# Patient Record
Sex: MALE | Race: WHITE | Employment: FULL TIME | URBAN - METROPOLITAN AREA
[De-identification: names, ages, dates, MRNs, and addresses within clinical notes are randomized per-mention and may not be internally consistent; named-entity substitution may affect disease eponyms.]

---

## 2017-11-24 ENCOUNTER — ALLIED HEALTH/NURSE VISIT (OUTPATIENT)
Dept: NURSING | Facility: CLINIC | Age: 47
End: 2017-11-24
Payer: COMMERCIAL

## 2017-11-24 DIAGNOSIS — Z23 NEED FOR PROPHYLACTIC VACCINATION AND INOCULATION AGAINST INFLUENZA: Primary | ICD-10-CM

## 2017-11-24 PROCEDURE — 90471 IMMUNIZATION ADMIN: CPT

## 2017-11-24 PROCEDURE — 90686 IIV4 VACC NO PRSV 0.5 ML IM: CPT

## 2017-11-24 PROCEDURE — 99207 ZZC NO CHARGE NURSE ONLY: CPT

## 2017-11-24 NOTE — PROGRESS NOTES

## 2017-11-24 NOTE — MR AVS SNAPSHOT
After Visit Summary   11/24/2017    Braden Solomonopp    MRN: [id number]           Patient Information     Date Of Birth          1970        Visit Information        Provider Department      11/24/2017 8:45 AM  FLU CLINIC NURSE [facility]        Today's Diagnoses     Need for prophylactic vaccination and inoculation against influenza    -  1       Follow-ups after your visit        Your next 10 appointments already scheduled     Dec 15, 2017  7:45 AM CST   Return Visit with Clemente Lindsey MD   [facility] ([facility]    [address]  [facility] [address] [phone]              Who to contact     If you have questions or need follow up information about today's clinic visit or your schedule please contact [facility] directly at [phone].  Normal or non-critical lab and imaging results will be communicated to you by [employer], letter or phone within 4 business days after the clinic has received the results. If you do not hear from us within 7 days, please contact the clinic through [employer] or phone. If you have a critical or abnormal lab result, we will notify you by phone as soon as possible.  Submit refill requests through [employer] or call your pharmacy and they will forward the refill request to us. Please allow 3 business days for your refill to be completed.          Additional Information About Your Visit        MyChart Information     [employer] gives you secure access to your electronic health record. If you see a primary care provider, you can also send messages to your care team and make appointments. If you have questions, please call your primary care clinic.  If you do not have a primary care provider, please call [phone] and they will assist you.        Care EveryWhere ID     This is your Care EveryWhere ID. This could be used by other organizations to access your  [place] medical records  [id number]         Blood Pressure from Last 3 Encounters:   12/12/16 148/72   12/07/16 114/81   11/25/16 124/86    Weight from Last 3 Encounters:   12/12/16 254 lb (115.2 kg)   11/25/16 264 lb (119.7 kg)   12/19/13 250 lb (113.4 kg)              We Performed the Following     FLU VAC, SPLIT VIRUS IM > 3 YO (QUADRIVALENT) [42185]     Vaccine Administration, Initial [46158]        Primary Care Provider Office Phone # Fax #    Fanta Malina Yee [phone] [address]  [facility] [address]        Equal Access to Services     REBECCA BOWMAN : Hadii andrae hernandezo Nilsa, waaxda luqadaha, qaybta kaalmada joannayadanielle, mary batista . So [facility] [phone].    ATENCIÓN: Si habla español, tiene a lara disposición servicios gratuitos de asistencia lingüística. [facility] [phone].    We comply with applicable federal civil rights laws and [place] laws. We do not discriminate on the basis of race, color, national origin, age, disability, sex, sexual orientation, or gender identity.            Thank you!     Thank you for choosing [facility]  for your care. Our goal is always to provide you with excellent care. Hearing back from our patients is one way we can continue to improve our services. Please take a few minutes to complete the written survey that you may receive in the mail after your visit with us. Thank you!             Your Updated Medication List - Protect others around you: Learn how to safely use, store and throw away your medicines at www.disposemymeds.org.          This list is accurate as of: 11/24/17  8:53 AM.  Always use your most recent med list.                   Brand Name Dispense Instructions for use Diagnosis    metoprolol 50 MG tablet    LOPRESSOR    30 tablet    Take 0.5 tablets (25 mg) by mouth as needed    Paroxysmal atrial fibrillation (H)       rivaroxaban ANTICOAGULANT 20 MG Tabs tablet    XARELTO     30 tablet    Take 1 tablet (20 mg) by mouth daily (with dinner)

## 2017-12-15 ENCOUNTER — OFFICE VISIT (OUTPATIENT)
Dept: CARDIOLOGY | Facility: CLINIC | Age: 47
End: 2017-12-15
Attending: INTERNAL MEDICINE
Payer: COMMERCIAL

## 2017-12-15 VITALS
WEIGHT: 278.8 LBS | BODY MASS INDEX: 32.26 KG/M2 | SYSTOLIC BLOOD PRESSURE: 128 MMHG | HEIGHT: 78 IN | HEART RATE: 80 BPM | DIASTOLIC BLOOD PRESSURE: 76 MMHG

## 2017-12-15 DIAGNOSIS — I48.0 PAROXYSMAL ATRIAL FIBRILLATION (H): ICD-10-CM

## 2017-12-15 PROCEDURE — 99213 OFFICE O/P EST LOW 20 MIN: CPT | Performed by: INTERNAL MEDICINE

## 2017-12-15 NOTE — PROGRESS NOTES
CARDIOLOGY VISIT    REASON FOR VISIT: f/u afib    SUBJECTIVE:  47-year-old male seen for f/u of paroxysmal atrial fibrillation. He has no significant cardiac risk factors.     Historically patient had episodes of palpitations about twice per year and this was never captured on any monitor for quite some time.    In December 2016 he presented to the ED with palpitations.  He was in rapid afib and was successfully cardioverted.    Echo December 9, 2016: Normal left ventricular size and thickness, ejection fraction 50%, no significant valve disease, ascending aorta 3.9 cm, which is borderline enlarged for patient's size    On visit December 2016 he was instructed to take metoprolol as needed for symptomatic A. fib.     The past one year he has been doing very well.  He denies any episodes of palpitations similar to his A. fib last year.  He has not needed to take any metoprolol.  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  He also does some light to moderate exercise with no exertional symptoms.  Blood pressure tends to run in the 120s.    MEDICATIONS:  Current Outpatient Prescriptions   Medication     metoprolol (LOPRESSOR) 50 MG tablet     rivaroxaban ANTICOAGULANT (XARELTO) 20 MG TABS tablet     No current facility-administered medications for this visit.        ALLERGIES:  No Known Allergies    REVIEW OF SYSTEMS:  Constitutional:  No weight loss, fever, chills, weakness or fatigue.  HEENT:  Eyes:  No visual loss, blurred vision, double vision or yellow sclerae. No hearing loss, sneezing, congestion, runny nose or sore throat.  Skin:  No rash or itching.  Cardiovascular: per HPI  Respiratory: per HPI  GI:  No anorexia, nausea, vomiting or diarrhea. No abdominal pain or blood.  :  No dysurea, hematuria  Neurologic:  No headache, dizziness, syncope, paralysis, ataxia, numbness or tingling in the extremities. No change in bowel or bladder control.  Musculoskeletal:  No  "muscle, back pain, joint pain or stiffness.  Hematologic:  No anemia, bleeding or bruising.  Lymphatics:  No enlarged nodes. No history of splenectomy.  Psychiatric:  No history of depression or anxiety.  Endocrine:  No reports of sweating, cold or heat intolerance. No polyuria or polydipsia.  Allergies:  No history of asthma, hives, eczema or rhinitis.    PHYSICAL EXAM:  /76  Pulse 80  Ht 1.981 m (6' 6\")  Wt 126.5 kg (278 lb 12.8 oz)  BMI 32.22 kg/m2  Constitutional: awake, alert, no distress  Eyes: PERRL, sclera nonicteric  ENT: trachea midline  Respiratory: Lungs clear  Cardiovascular: Regular rate and rhythm, single ectopy and 20 seconds auscultation, murmur  GI: nondistended, nontender, bowel sounds present  Lymph/Hematologic: no lymphadenopathy  Skin: dry, no rash  Musculoskeletal: good muscle tone, strength 5/5 in upper and lower extremities  Neurologic: no focal deficits  Neuropsychiatric: appropriate affact    ASSESSMENT:  47-year-old male seen for follow-up of paroxysmal atrial fibrillation.  He has not had any symptomatically A. fib in the past one year.  He has a supply of metoprolol at home, which he can take as needed if he had symptoms.  If he did develop more frequent A. fib, pill in the pocket strategy with flecainide might be reasonable.  He will take aspirin 81 mg, his CHADS-Vasc score is 0.    RECOMMENDATIONS:  1.  Paroxysmal atrial fibrillation  - Metoprolol as needed for any symptomatically A. fib, he will present to the ER if symptoms do not resolve after about one day  - Aspirin 81 mg daily    Follow up as needed if he has recurrent A. fib.    Clemente Lindsey MD  Cardiology - [facility]  Pager:  [phone]  Text Page  December 15, 2017    "

## 2017-12-15 NOTE — LETTER
12/15/2017    Fanta Yee MD  [address]  [facility] [address]    RE: Braden Lira       Dear Colleague,    I had the pleasure of seeing Braden Lira in the [facility].    CARDIOLOGY VISIT    REASON FOR VISIT: f/u afib    SUBJECTIVE:  47-year-old male seen for f/u of paroxysmal atrial fibrillation. He has no significant cardiac risk factors.     Historically patient had episodes of palpitations about twice per year and this was never captured on any monitor for quite some time.    In December 2016 he presented to the ED with palpitations.  He was in rapid afib and was successfully cardioverted.    Echo December 9, 2016: Normal left ventricular size and thickness, ejection fraction 50%, no significant valve disease, ascending aorta 3.9 cm, which is borderline enlarged for patient's size    On visit December 2016 he was instructed to take metoprolol as needed for symptomatic A. fib.     The past one year he has been doing very well.  He denies any episodes of palpitations similar to his A. fib last year.  He has not needed to take any metoprolol.  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  He also does some light to moderate exercise with no exertional symptoms.  Blood pressure tends to run in the 120s.    MEDICATIONS:  Current Outpatient Prescriptions   Medication     metoprolol (LOPRESSOR) 50 MG tablet     rivaroxaban ANTICOAGULANT (XARELTO) 20 MG TABS tablet     No current facility-administered medications for this visit.        ALLERGIES:  No Known Allergies    REVIEW OF SYSTEMS:  Constitutional:  No weight loss, fever, chills, weakness or fatigue.  HEENT:  Eyes:  No visual loss, blurred vision, double vision or yellow sclerae. No hearing loss, sneezing, congestion, runny nose or sore throat.  Skin:  No rash or itching.  Cardiovascular: per HPI  Respiratory: per HPI  GI:  No anorexia, nausea, vomiting or diarrhea. No  "abdominal pain or blood.  :  No dysurea, hematuria  Neurologic:  No headache, dizziness, syncope, paralysis, ataxia, numbness or tingling in the extremities. No change in bowel or bladder control.  Musculoskeletal:  No muscle, back pain, joint pain or stiffness.  Hematologic:  No anemia, bleeding or bruising.  Lymphatics:  No enlarged nodes. No history of splenectomy.  Psychiatric:  No history of depression or anxiety.  Endocrine:  No reports of sweating, cold or heat intolerance. No polyuria or polydipsia.  Allergies:  No history of asthma, hives, eczema or rhinitis.    PHYSICAL EXAM:  /76  Pulse 80  Ht 1.981 m (6' 6\")  Wt 126.5 kg (278 lb 12.8 oz)  BMI 32.22 kg/m2  Constitutional: awake, alert, no distress  Eyes: PERRL, sclera nonicteric  ENT: trachea midline  Respiratory: Lungs clear  Cardiovascular: Regular rate and rhythm, single ectopy and 20 seconds auscultation, murmur  GI: nondistended, nontender, bowel sounds present  Lymph/Hematologic: no lymphadenopathy  Skin: dry, no rash  Musculoskeletal: good muscle tone, strength 5/5 in upper and lower extremities  Neurologic: no focal deficits  Neuropsychiatric: appropriate affact    ASSESSMENT:  47-year-old male seen for follow-up of paroxysmal atrial fibrillation.  He has not had any symptomatically A. fib in the past one year.  He has a supply of metoprolol at home, which he can take as needed if he had symptoms.  If he did develop more frequent A. fib, pill in the pocket strategy with flecainide might be reasonable.  He will take aspirin 81 mg, his CHADS-Vasc score is 0.    RECOMMENDATIONS:  1.  Paroxysmal atrial fibrillation  - Metoprolol as needed for any symptomatically A. fib, he will present to the ER if symptoms do not resolve after about one day  - Aspirin 81 mg daily    Follow up as needed if he has recurrent A. fib.    Clemente Lindsey MD  Cardiology - [facility]  Pager:  [phone]  Text Page  December 15, 2017    Thank you for allowing " me to participate in the care of your patient.    Sincerely,     Clemente Lindsey MD     [facility]

## 2017-12-15 NOTE — MR AVS SNAPSHOT
"              After Visit Summary   12/15/2017    Braden Lira    MRN: [id number]           Patient Information     Date Of Birth          1970        Visit Information        Provider Department      12/15/2017 7:45 AM Clemente Lindsey MD [facility]        Today's Diagnoses     Paroxysmal atrial fibrillation (H)           Follow-ups after your visit        Who to contact     If you have questions or need follow up information about today's clinic visit or your schedule please contact [facility] directly at [phone].  Normal or non-critical lab and imaging results will be communicated to you by [employer], letter or phone within 4 business days after the clinic has received the results. If you do not hear from us within 7 days, please contact the clinic through [employer] or phone. If you have a critical or abnormal lab result, we will notify you by phone as soon as possible.  Submit refill requests through [employer] or call your pharmacy and they will forward the refill request to us. Please allow 3 business days for your refill to be completed.          Additional Information About Your Visit        MyChart Information     [employer] gives you secure access to your electronic health record. If you see a primary care provider, you can also send messages to your care team and make appointments. If you have questions, please call your primary care clinic.  If you do not have a primary care provider, please call [phone] and they will assist you.        Care EveryWhere ID     This is your Care EveryWhere ID. This could be used by other organizations to access your [place] medical records  [id number]        Your Vitals Were     Pulse Height BMI (Body Mass Index)             80 1.981 m (6' 6\") 32.22 kg/m2          Blood Pressure from Last 3 Encounters:   12/15/17 128/76   12/12/16 148/72   12/07/16 114/81    Weight " from Last 3 Encounters:   12/15/17 126.5 kg (278 lb 12.8 oz)   12/12/16 115.2 kg (254 lb)   11/25/16 119.7 kg (264 lb)              We Performed the Following     Follow-Up with Cardiologist        Primary Care Provider Office Phone # Fax #    Fanta Yee [phone]       [address]  [facility] [address]        Equal Access to Services     REBECCA BOWMAN : Hadii aad ku hadasho Soomaali, waaxda luqadaha, qaybta kaalmada adeegyada, waxay idiin hayaan adeeg kharash la'aan . So [facility] [phone].    ATENCIÓN: Si habla español, tiene a lara disposición servicios gratuitos de asistencia lingüística. [facility] [phone].    We comply with applicable federal civil rights laws and [place] laws. We do not discriminate on the basis of race, color, national origin, age, disability, sex, sexual orientation, or gender identity.            Thank you!     Thank you for choosing [facility]  for your care. Our goal is always to provide you with excellent care. Hearing back from our patients is one way we can continue to improve our services. Please take a few minutes to complete the written survey that you may receive in the mail after your visit with us. Thank you!             Your Updated Medication List - Protect others around you: Learn how to safely use, store and throw away your medicines at www.disposemymeds.org.          This list is accurate as of: 12/15/17  8:09 AM.  Always use your most recent med list.                   Brand Name Dispense Instructions for use Diagnosis    metoprolol 50 MG tablet    LOPRESSOR    30 tablet    Take 0.5 tablets (25 mg) by mouth as needed    Paroxysmal atrial fibrillation (H)

## 2017-12-20 ENCOUNTER — DOCUMENTATION ONLY (OUTPATIENT)
Dept: CARDIOLOGY | Facility: CLINIC | Age: 47
End: 2017-12-20

## 2017-12-20 NOTE — PROGRESS NOTES
"Pt saw Dr. Lindsey 12/15/17 who  Noted:   \"Paroxysmal atrial fibrillation  - Metoprolol as needed for any symptomatically A. fib, he will present to the ER if symptoms do not resolve after about one day  - Aspirin 81 mg daily  Follow up as needed if he has recurrent A. Fib\".     Med list updated to show pt taking ASA 81 mg daily.   Pascual BRIGGS    "

## 2018-07-10 ENCOUNTER — OFFICE VISIT (OUTPATIENT)
Dept: URGENT CARE | Facility: URGENT CARE | Age: 48
End: 2018-07-10
Payer: COMMERCIAL

## 2018-07-10 ENCOUNTER — NURSE TRIAGE (OUTPATIENT)
Dept: NURSING | Facility: CLINIC | Age: 48
End: 2018-07-10

## 2018-07-10 VITALS
OXYGEN SATURATION: 97 % | TEMPERATURE: 98.2 F | SYSTOLIC BLOOD PRESSURE: 132 MMHG | DIASTOLIC BLOOD PRESSURE: 84 MMHG | BODY MASS INDEX: 30.39 KG/M2 | WEIGHT: 263 LBS | HEART RATE: 70 BPM

## 2018-07-10 DIAGNOSIS — L03.221 CELLULITIS OF NECK: Primary | ICD-10-CM

## 2018-07-10 PROCEDURE — 99213 OFFICE O/P EST LOW 20 MIN: CPT | Performed by: PHYSICIAN ASSISTANT

## 2018-07-10 RX ORDER — CEPHALEXIN 500 MG/1
500 CAPSULE ORAL 3 TIMES DAILY
Qty: 30 CAPSULE | Refills: 0 | Status: SHIPPED | OUTPATIENT
Start: 2018-07-10 | End: 2018-07-10

## 2018-07-10 RX ORDER — CEPHALEXIN 500 MG/1
500 CAPSULE ORAL 4 TIMES DAILY
Qty: 28 CAPSULE | Refills: 0 | Status: SHIPPED | OUTPATIENT
Start: 2018-07-10 | End: 2018-07-13

## 2018-07-10 NOTE — MR AVS SNAPSHOT
After Visit Summary   7/10/2018    Braden Masterson    MRN: [id number]           Patient Information     Date Of Birth          1970        Visit Information        Provider Department      7/10/2018 8:45 PM Esdras Melendez PA-C [facility]        Today's Diagnoses     Cellulitis of neck    -  1      Care Instructions    Follow up with primary in two days  Return sooner with worsening of symptoms    Cellulitis  Cellulitis is an infection of the deep layers of skin. A break in the skin, such as a cut or scratch, can let bacteria under the skin. If the bacteria get to deep layers of the skin, it can be serious. If not treated, cellulitis can get into the bloodstream and lymph nodes. The infection can then spread throughout the body. This causes serious illness.  Cellulitis causes the affected skin to become red, swollen, warm, and sore. The reddened areas have a visible border. An open sore may leak fluid (pus). You may have a fever, chills, and pain.  Cellulitis is treated with antibiotics taken for 7 to 10 days. An open sore may be cleaned and covered with cool wet gauze. Symptoms should get better 1 to 2 days after treatment is started. Make sure to take all the antibiotics for the full number of days until they are gone. Keep taking the medicine even if your symptoms go away.  Home care  Follow these tips:    Limit the use of the part of your body with cellulitis.     If the infection is on your leg, keep your leg raised while sitting. This will help to reduce swelling.    Take all of the antibiotic medicine exactly as directed until it is gone. Do not miss any doses, especially during the first 7 days. Don t stop taking the medicine when your symptoms get better.    Keep the affected area clean and dry.    Wash your hands with soap and warm water before and after touching your skin. Anyone else who touches your skin should also wash his or her hands. Don't share  towels.  Follow-up care  Follow up with your healthcare provider, or as advised. If your infection does not go away on the first antibiotic, your healthcare provider will prescribe a different one.  When to seek medical advice  Call your healthcare provider right away if any of these occur:    Red areas that spread    Swelling or pain that gets worse    Fluid leaking from the skin (pus)    Fever higher of 100.4  F (38.0  C) or higher after 2 days on antibiotics  Date Last Reviewed: 9/1/2016 2000-2017 The "[employer]. [address] [place] [address]. All rights reserved. This information is not intended as a substitute for professional medical care. Always follow your healthcare professional's instructions.                Follow-ups after your visit        Your next 10 appointments already scheduled     Aug 09, 2018  8:00 AM CDT   PHYSICAL with Fanta Yee MD   [facility] ([facility])    [address] [place] [address] [phone]              Who to contact     If you have questions or need follow up information about today's clinic visit or your schedule please contact [facility] directly at [phone].  Normal or non-critical lab and imaging results will be communicated to you by MyChart, letter or phone within 4 business days after the clinic has received the results. If you do not hear from us within 7 days, please contact the clinic through MyChart or phone. If you have a critical or abnormal lab result, we will notify you by phone as soon as possible.  Submit refill requests through [employer] or call your pharmacy and they will forward the refill request to us. Please allow 3 business days for your refill to be completed.          Additional Information About Your Visit        [employer] Information     [employer] gives you secure access to your electronic health record. If you see a primary care provider, you can  also send messages to your care team and make appointments. If you have questions, please call your primary care clinic.  If you do not have a primary care provider, please call [phone] and they will assist you.        Care EveryWhere ID     This is your Care EveryWhere ID. This could be used by other organizations to access your [place] medical records  [id number]        Your Vitals Were     Pulse Temperature Pulse Oximetry BMI (Body Mass Index)          70 98.2  F (36.8  C) 97% 30.39 kg/m2         Blood Pressure from Last 3 Encounters:   07/10/18 132/84   12/15/17 128/76   12/12/16 148/72    Weight from Last 3 Encounters:   07/10/18 263 lb (119.3 kg)   12/15/17 278 lb 12.8 oz (126.5 kg)   12/12/16 254 lb (115.2 kg)              Today, you had the following     No orders found for display         Today's Medication Changes          These changes are accurate as of 7/10/18  9:06 PM.  If you have any questions, ask your nurse or doctor.               Start taking these medicines.        Dose/Directions    cephALEXin 500 MG capsule   Commonly known as:  KEFLEX   Used for:  Cellulitis of neck   Started by:  Esdras Melendez PA-C        Dose:  500 mg   Take 1 capsule (500 mg) by mouth 4 times daily for 7 days   Quantity:  28 capsule   Refills:  0            Where to get your medicines      These medications were sent to [facility] [address] [place] [address] AT [address] [address]    Hours:  24-hours Phone:  [phone]     cephALEXin 500 MG capsule                Primary Care Provider Office Phone # Fax #    Fanta Yee [phone] [address] [facility] [address]        Equal Access to Services     REBECCA BOWMAN AH: Rosalia hernandezo Soharry, waaxda luqadaha, qaybta kaalmada adeanayada, amry patrick. So [facility] [phone].    ATENCIÓN: Si clemente starkey,  tiene a lara disposición servicios gratuitos de asistencia lingüística. Jaden gerber [phone].    We comply with applicable federal civil rights laws and [place] laws. We do not discriminate on the basis of race, color, national origin, age, disability, sex, sexual orientation, or gender identity.            Thank you!     Thank you for choosing [facility]  for your care. Our goal is always to provide you with excellent care. Hearing back from our patients is one way we can continue to improve our services. Please take a few minutes to complete the written survey that you may receive in the mail after your visit with us. Thank you!             Your Updated Medication List - Protect others around you: Learn how to safely use, store and throw away your medicines at www.disposemymeds.org.          This list is accurate as of 7/10/18  9:06 PM.  Always use your most recent med list.                   Brand Name Dispense Instructions for use Diagnosis    ASPIRIN PO      Take 81 mg by mouth daily        cephALEXin 500 MG capsule    KEFLEX    28 capsule    Take 1 capsule (500 mg) by mouth 4 times daily for 7 days    Cellulitis of neck       metoprolol tartrate 50 MG tablet    LOPRESSOR    30 tablet    Take 0.5 tablets (25 mg) by mouth as needed    Paroxysmal atrial fibrillation (H)

## 2018-07-11 NOTE — PATIENT INSTRUCTIONS
Follow up with primary in two days  Return sooner with worsening of symptoms    Cellulitis  Cellulitis is an infection of the deep layers of skin. A break in the skin, such as a cut or scratch, can let bacteria under the skin. If the bacteria get to deep layers of the skin, it can be serious. If not treated, cellulitis can get into the bloodstream and lymph nodes. The infection can then spread throughout the body. This causes serious illness.  Cellulitis causes the affected skin to become red, swollen, warm, and sore. The reddened areas have a visible border. An open sore may leak fluid (pus). You may have a fever, chills, and pain.  Cellulitis is treated with antibiotics taken for 7 to 10 days. An open sore may be cleaned and covered with cool wet gauze. Symptoms should get better 1 to 2 days after treatment is started. Make sure to take all the antibiotics for the full number of days until they are gone. Keep taking the medicine even if your symptoms go away.  Home care  Follow these tips:    Limit the use of the part of your body with cellulitis.     If the infection is on your leg, keep your leg raised while sitting. This will help to reduce swelling.    Take all of the antibiotic medicine exactly as directed until it is gone. Do not miss any doses, especially during the first 7 days. Don t stop taking the medicine when your symptoms get better.    Keep the affected area clean and dry.    Wash your hands with soap and warm water before and after touching your skin. Anyone else who touches your skin should also wash his or her hands. Don't share towels.  Follow-up care  Follow up with your healthcare provider, or as advised. If your infection does not go away on the first antibiotic, your healthcare provider will prescribe a different one.  When to seek medical advice  Call your healthcare provider right away if any of these occur:    Red areas that spread    Swelling or pain that gets worse    Fluid leaking from  the skin (pus)    Fever higher of 100.4  F (38.0  C) or higher after 2 days on antibiotics  Date Last Reviewed: 9/1/2016 2000-2017 [employer]. [address] [place] [address], [place] [address]. All rights reserved. This information is not intended as a substitute for professional medical care. Always follow your healthcare professional's instructions.

## 2018-07-11 NOTE — PROGRESS NOTES
SUBJECTIVE:  Braden Lira is a 48 year old male who presents to the clinic today for an infected hair folicle on neck.  Has been worsening over the last 2 days .  Quality/symptoms of rash: painful, red and swollen   Symptoms are moderate and rash seems to be worsening.  Previous history of a similar rash? No    Associated symptoms include: nothing.    Past Medical History:   Diagnosis Date     Atrial fibrillation (H)     cardioversion 12/7/2016     NO ACTIVE PROBLEMS      Current Outpatient Prescriptions   Medication Sig Dispense Refill     ASPIRIN PO Take 81 mg by mouth daily       cephALEXin (KEFLEX) 500 MG capsule Take 1 capsule (500 mg) by mouth 4 times daily for 7 days 28 capsule 0     metoprolol (LOPRESSOR) 50 MG tablet Take 0.5 tablets (25 mg) by mouth as needed (Patient not taking: Reported on 12/15/2017) 30 tablet 2     Social History   Substance Use Topics     Smoking status: Never Smoker     Smokeless tobacco: Never Used     Alcohol use Yes      Comment: 10 drinks a week       ROS:  Review of systems negative except as stated above.    EXAM:   /84  Pulse 70  Temp 98.2  F (36.8  C)  Wt 263 lb (119.3 kg)  SpO2 97%  BMI 30.39 kg/m2  GENERAL: alert, no acute distress.  SKIN: Rash description:    Distribution: localized  Location: neck    Color: red,  Lesion type: induraton  GENERAL APPEARANCE: healthy, alert and no distress  EYES: EOMI,  PERRL, conjunctiva clear  NECK: supple, non-tender to palpation, no adenopathy noted  RESP: lungs clear to auscultation - no rales, rhonchi or wheezes  CV: regular rates and rhythm, normal S1 S2, no murmur noted    ASSESSMENT:  (L03.221) Cellulitis of neck  (primary encounter diagnosis)  Plan: cephALEXin (KEFLEX) 500 MG capsule,         DISCONTINUED: cephALEXin (KEFLEX) 500 MG         capsule    Patient Instructions   Follow up with primary in two days  Return sooner with worsening of symptoms    Cellulitis  Cellulitis is an infection of the deep layers of skin. A  break in the skin, such as a cut or scratch, can let bacteria under the skin. If the bacteria get to deep layers of the skin, it can be serious. If not treated, cellulitis can get into the bloodstream and lymph nodes. The infection can then spread throughout the body. This causes serious illness.  Cellulitis causes the affected skin to become red, swollen, warm, and sore. The reddened areas have a visible border. An open sore may leak fluid (pus). You may have a fever, chills, and pain.  Cellulitis is treated with antibiotics taken for 7 to 10 days. An open sore may be cleaned and covered with cool wet gauze. Symptoms should get better 1 to 2 days after treatment is started. Make sure to take all the antibiotics for the full number of days until they are gone. Keep taking the medicine even if your symptoms go away.  Home care  Follow these tips:    Limit the use of the part of your body with cellulitis.     If the infection is on your leg, keep your leg raised while sitting. This will help to reduce swelling.    Take all of the antibiotic medicine exactly as directed until it is gone. Do not miss any doses, especially during the first 7 days. Don t stop taking the medicine when your symptoms get better.    Keep the affected area clean and dry.    Wash your hands with soap and warm water before and after touching your skin. Anyone else who touches your skin should also wash his or her hands. Don't share towels.  Follow-up care  Follow up with your healthcare provider, or as advised. If your infection does not go away on the first antibiotic, your healthcare provider will prescribe a different one.  When to seek medical advice  Call your healthcare provider right away if any of these occur:    Red areas that spread    Swelling or pain that gets worse    Fluid leaking from the skin (pus)    Fever higher of 100.4  F (38.0  C) or higher after 2 days on antibiotics  Date Last Reviewed: 9/1/2016 2000-2017 [employer]. [address] [place] [address], [place] [address]. All rights reserved. This information is not intended as a substitute for professional medical care. Always follow your healthcare professional's instructions.

## 2018-07-11 NOTE — TELEPHONE ENCOUNTER
[facility] pharmacist is caller. Said they received 2 prescriptions for cephalexin for the patient. She wanted to verify which prescription should be filled. Was given the following information from the patient's medication tab:     cephALEXin (KEFLEX) 500 MG capsule 28 capsule 0 7/10/2018 7/17/2018 --   Sig - Route: Take 1 capsule (500 mg) by mouth 4 times daily for 7 days - Oral     Mirta Browne RN/FNA

## 2018-07-13 ENCOUNTER — OFFICE VISIT (OUTPATIENT)
Dept: FAMILY MEDICINE | Facility: CLINIC | Age: 48
End: 2018-07-13
Payer: COMMERCIAL

## 2018-07-13 VITALS
TEMPERATURE: 98.6 F | OXYGEN SATURATION: 98 % | SYSTOLIC BLOOD PRESSURE: 126 MMHG | WEIGHT: 262.25 LBS | BODY MASS INDEX: 30.31 KG/M2 | HEART RATE: 68 BPM | RESPIRATION RATE: 16 BRPM | DIASTOLIC BLOOD PRESSURE: 85 MMHG

## 2018-07-13 DIAGNOSIS — I48.0 PAROXYSMAL ATRIAL FIBRILLATION (H): ICD-10-CM

## 2018-07-13 DIAGNOSIS — L03.221 CELLULITIS OF NECK: Primary | ICD-10-CM

## 2018-07-13 PROCEDURE — 99214 OFFICE O/P EST MOD 30 MIN: CPT | Performed by: FAMILY MEDICINE

## 2018-07-13 RX ORDER — CEPHALEXIN 500 MG/1
500 CAPSULE ORAL 4 TIMES DAILY
Qty: 20 CAPSULE | Refills: 0 | Status: SHIPPED | OUTPATIENT
Start: 2018-07-13 | End: 2018-07-18

## 2018-07-13 NOTE — PROGRESS NOTES
SUBJECTIVE:   Braden Lira is a 48 year old male who presents to clinic today for the following health issues:      ED/UC Follow up:    Facility:  [facility]  Date of visit: 7/10/2018  Reason for visit: Cellulitis of neck   Current Status:  Pt state that it have improve, swelling have went down and med worked well.      Started with bump under right chin got bigger, redder, & painful & seen in UC & given keflex on 7/10 for cellulitis. Has had 2.5 days of 7 day course so far. Here for a recheck. Redness and swelling has decreased. Feels it is better. Leaving tomorrow with family to trip to [facility] / [place]. Will be 38 miles form [place] where could see a doctor if needed.     No fever or chills, no headache or dizziness, no double or blurry vision, no facial pain, earache, sore throat, runny nose, post nasal drip, no trouble hearing, smelling, tasting or swallowing, no cough , no chest pain, trouble breathing. Hx of a fib not triggered by current illness. No abdominal pain, heart burn, reflux, nausea or vomiting or diarrhea or constipation, no blood in stools or black stools, no weight loss or night sweats. No dysuria, hematuria, frequency, urgency, hesitancy, incontinence, No pelvic complaints. No leg swelling or joint pain. No rash.     Has an appt with PCP Dr Yee in august.     New to me, patient of dr yee, , hx of episodes of palpitations about twice per year never captured on any monitor for quite some time. In December 2016 he presented to the ED with palpitations.  He was in rapid afib and was successfully cardioverted. Echo December 9, 2016: Normal left ventricular size and thickness, ejection fraction 50%, no significant valve disease, ascending aorta 3.9 cm, which was borderline enlarged for patient's size. He was given xarelto for 30 days then switched to asa as [id number] was =0, On visit December 2016 he was instructed to take metoprolol as needed for symptomatic  AJorge lo. He has not needed to take any metoprolol.  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  He also does some light to moderate exercise with no exertional symptoms.  Blood pressure tends to run in the 120's. FH of breast cancer, prior ganglion cyst removal right wrist.     Problem list and histories reviewed & adjusted, as indicated.  Additional history: as documented    Patient Active Problem List   Diagnosis     Family history of malignant neoplasm of breast     Atrial fibrillation (H)     Past Surgical History:   Procedure Laterality Date     CL [facility]FF SURGICAL PATHOLOGY  9-06    ganglion cyst removed from right wrist       Social History   Substance Use Topics     Smoking status: Never Smoker     Smokeless tobacco: Never Used     Alcohol use Yes      Comment: 10 drinks a week     Family History   Problem Relation Age of Onset     Breast Cancer Mother      re-occuring x 1 BRAC +     Eye Disorder Father      cataracts     C.A.D. Father      MI in 3/09     Diabetes Maternal Grandmother      Eye Disorder Maternal Grandmother      cataracts     C.A.D. Paternal Grandfather      C.A.D. Paternal Uncle      Cerebrovascular Disease Paternal Aunt      Family History Negative Sister          Current Outpatient Prescriptions   Medication Sig Dispense Refill     ASPIRIN PO Take 81 mg by mouth daily       cephALEXin (KEFLEX) 500 MG capsule Take 1 capsule (500 mg) by mouth 4 times daily for 5 days 20 capsule 0     metoprolol (LOPRESSOR) 50 MG tablet Take 0.5 tablets (25 mg) by mouth as needed 30 tablet 2     No Known Allergies  Recent Labs   Lab Test  12/07/16   0825  01/15/13   1645   LDL   --   58   HDL   --   41   TRIG   --   183*   CR  1.25   --    GFRESTIMATED  62   --    GFRESTBLACK  75   --    POTASSIUM  4.0   --    TSH  0.73   --       BP Readings from Last 3 Encounters:   07/13/18 126/85   07/10/18 132/84   12/15/17 128/76    Wt Readings from Last 3 Encounters:   07/13/18  262 lb 4 oz (119 kg)   07/10/18 263 lb (119.3 kg)   12/15/17 278 lb 12.8 oz (126.5 kg)                  Labs reviewed in EPIC    Reviewed and updated as needed this visit by clinical staff  Tobacco  Allergies  Meds  Med Hx  Surg Hx  Fam Hx  Soc Hx      Reviewed and updated as needed this visit by Provider         ROS:  Constitutional, HEENT, cardiovascular, pulmonary, GI, , musculoskeletal, neuro, skin, endocrine and psych systems are negative, except as otherwise noted.    OBJECTIVE:     /85 (BP Location: Right arm, Patient Position: Chair, Cuff Size: Adult Regular)  Pulse 68  Temp 98.6  F (37  C) (Oral)  Resp 16  Wt 262 lb 4 oz (119 kg)  SpO2 98%  BMI 30.31 kg/m2  Body mass index is 30.31 kg/(m^2).  GENERAL: healthy, alert and no distress  EYES: Eyes grossly normal to inspection, PERRL and conjunctivae and sclerae normal  HENT: ear canals and TM's normal, nose and mouth without ulcers or lesions  NECK: no adenopathy, thyroid normal to palpation, trachea midline and normal to palpation, no carotid bruits and below right chin / submandibular area anterior neck is a 2.5 cm lump that is red and indurated consistent with cellulitis no fluctuance felt. No punctum or drainage  RESP: lungs clear to auscultation - no rales, rhonchi or wheezes  CV: regular rate and rhythm, normal S1 S2, no S3 or S4, no murmur, click or rub, no peripheral edema and peripheral pulses strong  ABDOMEN: soft, non tender, no hepatosplenomegaly, no masses and bowel sounds normal  MS: no gross musculoskeletal defects noted, no edema  SKIN: no suspicious lesions or rashes  NEURO: Normal strength and tone, mentation intact and speech normal  PSYCH: mentation appears normal, affect normal/bright    Diagnostic Test Results:  No results found for this or any previous visit (from the past 24 hour(s)).    ASSESSMENT/PLAN:     1. Cellulitis of neck  Cellulitis and early abscess below right chin in upper neck better. Warm pack to help  come to a head. Do not press on it unless spontaneously drains. Given travel to remote area given 5 additional days of keflex so total 12 days  And will See back week of 7/23 for recheck. If has increased fever, redness, pain or gets worse to seek local health care   - cephALEXin (KEFLEX) 500 MG capsule; Take 1 capsule (500 mg) by mouth 4 times daily for 5 days  Dispense: 20 capsule; Refill: 0    2. Paroxysmal atrial fibrillation (H)  Asymptomatic. Not affected by current illness. Has not had to use metoprolol.     See Patient Instructions    Trudy Stearns MD  [facility]

## 2018-07-13 NOTE — MR AVS SNAPSHOT
After Visit Summary   7/13/2018    Braden Solomonopp    MRN: [id number]           Patient Information     Date Of Birth          1970        Visit Information        Provider Department      7/13/2018 11:20 AM Trudy Stearns MD [facility]        Today's Diagnoses     Cellulitis of neck    -  1    Paroxysmal atrial fibrillation (H)          Care Instructions    Cellulitis and early abscess below right chin in upper neck better  Warm pack to help come to a head  Do not press on it unless spontaneously drains  5 additional days of keflex so total 12 days   See you back week of 7/23 for recheck  If has increased fever, redness, pain or gets worse seek local health care           Follow-ups after your visit        Your next 10 appointments already scheduled     Aug 09, 2018  8:00 AM CDT   PHYSICAL with Fanta Yee MD   [facility]    [address] [place] [id number] [phone]              Who to contact     If you have questions or need follow up information about today's clinic visit or your schedule please contact [facility] directly at [phone].  Normal or non-critical lab and imaging results will be communicated to you by MyChart, letter or phone within 4 business days after the clinic has received the results. If you do not hear from us within 7 days, please contact the clinic through MyChart or phone. If you have a critical or abnormal lab result, we will notify you by phone as soon as possible.  Submit refill requests through [facility] or call your pharmacy and they will forward the refill request to us. Please allow 3 business days for your refill to be completed.          Additional Information About Your Visit        MyChart Information     [facility]chy gives you secure access to your electronic health record. If you see a primary care provider, you can also send messages to your care team and make  appointments. If you have questions, please call your primary care clinic.  If you do not have a primary care provider, please call [phone] and they will assist you.        Care EveryWhere ID     This is your Care EveryWhere ID. This could be used by other organizations to access your [place] medical records  [id number]        Your Vitals Were     Pulse Temperature Respirations Pulse Oximetry BMI (Body Mass Index)       68 98.6  F (37  C) (Oral) 16 98% 30.31 kg/m2        Blood Pressure from Last 3 Encounters:   07/13/18 126/85   07/10/18 132/84   12/15/17 128/76    Weight from Last 3 Encounters:   07/13/18 262 lb 4 oz (119 kg)   07/10/18 263 lb (119.3 kg)   12/15/17 278 lb 12.8 oz (126.5 kg)              Today, you had the following     No orders found for display         Where to get your medicines      These medications were sent to [email or web address] [address] [place] [address] [address] AT [facility] [address] [facility] [address]     Phone:  [phone]     cephALEXin 500 MG capsule          Primary Care Provider Office Phone # Fax #    Fanta Yee [phone] [address]  [facility] [address]        Equal Access to Services     REBECCA BOWMAN : Hadii andrae luna hadnelsono Soharry, waaxda luqadaha, qaybta kaalmadanielle hui, mary patrick. So [facility] [phone].    ATENCIÓN: Si habla español, tiene a lara disposición servicios gratuitos de asistencia lingüística. Jaden gerber [phone].    We comply with applicable federal civil rights laws and [place] laws. We do not discriminate on the basis of race, color, national origin, age, disability, sex, sexual orientation, or gender identity.            Thank you!     Thank you for choosing [facility]  for your care. Our goal is always to provide you with excellent care. Hearing back from our patients is one way we can continue to improve our services.  Please take a few minutes to complete the written survey that you may receive in the mail after your visit with us. Thank you!             Your Updated Medication List - Protect others around you: Learn how to safely use, store and throw away your medicines at www.disposemymeds.org.          This list is accurate as of 7/13/18 12:04 PM.  Always use your most recent med list.                   Brand Name Dispense Instructions for use Diagnosis    ASPIRIN PO      Take 81 mg by mouth daily        cephALEXin 500 MG capsule    KEFLEX    20 capsule    Take 1 capsule (500 mg) by mouth 4 times daily for 5 days    Cellulitis of neck       metoprolol tartrate 50 MG tablet    LOPRESSOR    30 tablet    Take 0.5 tablets (25 mg) by mouth as needed    Paroxysmal atrial fibrillation (H)

## 2018-07-13 NOTE — PATIENT INSTRUCTIONS
Cellulitis and early abscess below right chin in upper neck better  Warm pack to help come to a head  Do not press on it unless spontaneously drains  5 additional days of keflex so total 12 days   See you back week of 7/23 for recheck  If has increased fever, redness, pain or gets worse seek local health care

## 2018-07-26 ENCOUNTER — OFFICE VISIT (OUTPATIENT)
Dept: FAMILY MEDICINE | Facility: CLINIC | Age: 48
End: 2018-07-26
Payer: COMMERCIAL

## 2018-07-26 VITALS
BODY MASS INDEX: 30.48 KG/M2 | TEMPERATURE: 98.6 F | HEART RATE: 72 BPM | SYSTOLIC BLOOD PRESSURE: 128 MMHG | WEIGHT: 263.75 LBS | RESPIRATION RATE: 16 BRPM | DIASTOLIC BLOOD PRESSURE: 80 MMHG | OXYGEN SATURATION: 98 %

## 2018-07-26 DIAGNOSIS — L72.3 SEBACEOUS CYST: Primary | ICD-10-CM

## 2018-07-26 DIAGNOSIS — L03.221 CELLULITIS OF NECK: ICD-10-CM

## 2018-07-26 PROCEDURE — 99213 OFFICE O/P EST LOW 20 MIN: CPT | Performed by: FAMILY MEDICINE

## 2018-07-26 NOTE — PROGRESS NOTES
SUBJECTIVE:   Braden Lira is a 48 year old male who presents to clinic today for the following health issues:      Pt is here to follow up with last OV for Cellulitis of neck.     Current status: pt state he is doing fine, spot is harden now and there is no pain, no swelling and no redness.     Seen 7/11 in  for worsening boil under right chin on anterior neck and given Keflex. Then seen 7/13 for follow up of right submental cellulitis and abscess and since going away on vacation given additional Keflex to complete total 12 day of meds last dose 7/23. Here for a recheck.    Had a good vacation. No fever or pain  And redness and swelling resolved. Drained twice a lot of pus came out. Once just in action of brushing teeth. No fever or chills, no headache or dizziness, no double or blurry vision, no facial pain, earache, sore throat, runny nose, post nasal drip, no trouble hearing, smelling, tasting or swallowing, no cough , no chest pain, trouble breathing or palpitations, No abdominal pain, heart burn, reflux, nausea or vomiting or diarrhea or constipation, no blood in stools or black stools, no weight loss or night sweats. No dysuria, hematuria, frequency, urgency, hesitancy, incontinence, No pelvic complaints. No leg swelling or joint pain. No rash.    Hx of episodes of palpitations about twice per year never captured on any monitor for quite some time. In December 2016 he presented to the ED with palpitations.  He was in rapid Afib and was successfully cardioverted. Echo December 9, 2016: Normal left ventricular size and thickness, ejection fraction 50%, no significant valve disease, ascending aorta 3.9 cm, which was borderline enlarged for patient's size. He was given xarelto for 30 days then switched to asa as [id number] was =0, On visit December 2016 he was instructed to take metoprolol as needed for symptomatic A. fib. He has not needed to take any metoprolol.  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  He also does some light to moderate exercise with no exertional symptoms.  Blood pressure tends to run in the 120's. FH of breast cancer, prior ganglion cyst removal right wrist.      Problem list and histories reviewed & adjusted, as indicated.  Additional history: as documented    Patient Active Problem List   Diagnosis     Family history of malignant neoplasm of breast     Atrial fibrillation (H)     Past Surgical History:   Procedure Laterality Date     CL AFF SURGICAL PATHOLOGY  9-06    ganglion cyst removed from right wrist       Social History   Substance Use Topics     Smoking status: Never Smoker     Smokeless tobacco: Never Used     Alcohol use Yes      Comment: 10 drinks a week     Family History   Problem Relation Age of Onset     Breast Cancer Mother      re-occuring x 1 BRAC +     Eye Disorder Father      cataracts     C.A.D. Father      MI in 3/09     Diabetes Maternal Grandmother      Eye Disorder Maternal Grandmother      cataracts     C.A.D. Paternal Grandfather      C.A.D. Paternal Uncle      Cerebrovascular Disease Paternal Aunt      Family History Negative Sister          Current Outpatient Prescriptions   Medication Sig Dispense Refill     ASPIRIN PO Take 81 mg by mouth daily       metoprolol (LOPRESSOR) 50 MG tablet Take 0.5 tablets (25 mg) by mouth as needed 30 tablet 2     No Known Allergies  Recent Labs   Lab Test  12/07/16   0825  01/15/13   1645   LDL   --   58   HDL   --   41   TRIG   --   183*   CR  1.25   --    GFRESTIMATED  62   --    GFRESTBLACK  75   --    POTASSIUM  4.0   --    TSH  0.73   --       BP Readings from Last 3 Encounters:   07/26/18 128/80   07/13/18 126/85   07/10/18 132/84    Wt Readings from Last 3 Encounters:   07/26/18 263 lb 12 oz (119.6 kg)   07/13/18 262 lb 4 oz (119 kg)   07/10/18 263 lb (119.3 kg)                  Labs reviewed in EPIC    Reviewed and updated as needed this visit by clinical staff       Reviewed  and updated as needed this visit by Provider         ROS:  Constitutional, HEENT, cardiovascular, pulmonary, GI, , musculoskeletal, neuro, skin, endocrine and psych systems are negative, except as otherwise noted.    OBJECTIVE:     /80 (BP Location: Left arm, Patient Position: Chair, Cuff Size: Adult Large)  Pulse 72  Temp 98.6  F (37  C) (Oral)  Resp 16  Wt 263 lb 12 oz (119.6 kg)  SpO2 98%  BMI 30.48 kg/m2  Body mass index is 30.48 kg/(m^2).  GENERAL: healthy, alert and no distress  EYES: Eyes grossly normal to inspection, PERRL and conjunctivae and sclerae normal  NECK: right submental area 1 cm lump that is firm and ot fluctuant without any redness, tenderness, erythema or drainage.no adenopathy,  or scars and thyroid normal to palpation  RESP: lungs clear to auscultation - no rales, rhonchi or wheezes  CV: regular rates and rhythm  ABDOMEN: soft, non tender  MS: no gross musculoskeletal defects noted, no edema  SKIN: no suspicious lesions or rashes  NEURO: Normal strength and tone, mentation intact and speech normal  PSYCH: mentation appears normal, affect normal/bright    Diagnostic Test Results:  No results found for this or any previous visit (from the past 24 hour(s)).    ASSESSMENT/PLAN:       ICD-10-CM    1. Sebaceous cyst L72.3 DERMATOLOGY REFERRAL   2. Cellulitis of neck L03.221 DERMATOLOGY REFERRAL     No Incision and drainage needed. cellulitis resolved. No abscess seen currently . Has a cyst / scar tissue left in place.  Lump should resolved on it own. Monitor for infection. Sometimes there is a cyst that wont go away and needs complete removal for that see dermatology. Referral given. See Primary Dr Yee as planned for physical with fasting labs   See Patient Instructions    Trudy Stearns MD  [facility]

## 2018-07-26 NOTE — MR AVS SNAPSHOT
After Visit Summary   7/26/2018    Braden Lira    MRN: [id number]           Patient Information     Date Of Birth          1970        Visit Information        Provider Department      7/26/2018 8:00 AM Trudy Stearns MD [facility] [place]        Today's Diagnoses     Sebaceous cyst    -  1    Cellulitis of neck          Care Instructions    No Insidon and drainage needed  Infection resolved  Lump should resolved on it own  Monitor for infection  Sometimes there is a cyst that wont go away and needs compelte removal for that see dermatology   See Primary Dr Yee as planned for physical with fasting labs 8/23          Follow-ups after your visit        Additional Services     DERMATOLOGY REFERRAL       Your provider has referred you to: [facility]: [facility] Dermatology [facility] [phone]   [email or web address]  [facility]: [facility] [phone]  [facility]: Dermatology [facility] [phone]   [email or web address]  [facility]: [facility] [phone]   [email or web address]    Please be aware that coverage of these services is subject to the terms and limitations of your health insurance plan.  Call member services at your health plan with any benefit or coverage questions.      Please bring the following with you to your appointment:    (1) Any X-Rays, CTs or MRIs which have been performed.  Contact the facility where they were done to arrange for  prior to your scheduled appointment.  Any new CT, MRI or other procedures ordered by your specialist must be performed at a [place] facility or coordinated by your clinic's referral office.  (2) List of current medications  (3) This referral request   (4) Any documents/labs given to you for this referral                  Your next 10 appointments already scheduled     Aug 23, 2018  8:20 AM CDT    PHYSICAL with Fanta Yee MD   [facility] ([facility])    [address] [place] [id number] [phone]              Who to contact     If you have questions or need follow up information about today's clinic visit or your schedule please contact [facility] directly at [phone].  Normal or non-critical lab and imaging results will be communicated to you by MyChart, letter or phone within 4 business days after the clinic has received the results. If you do not hear from us within 7 days, please contact the clinic through [employer] or phone. If you have a critical or abnormal lab result, we will notify you by phone as soon as possible.  Submit refill requests through [employer] or call your pharmacy and they will forward the refill request to us. Please allow 3 business days for your refill to be completed.          Additional Information About Your Visit        [employer] Information     [employer] gives you secure access to your electronic health record. If you see a primary care provider, you can also send messages to your care team and make appointments. If you have questions, please call your primary care clinic.  If you do not have a primary care provider, please call [phone] and they will assist you.        Care EveryWhere ID     This is your Care EveryWhere ID. This could be used by other organizations to access your [place] medical records  [id number]        Your Vitals Were     Pulse Temperature Respirations Pulse Oximetry BMI (Body Mass Index)       72 98.6  F (37  C) (Oral) 16 98% 30.48 kg/m2        Blood Pressure from Last 3 Encounters:   07/26/18 128/80   07/13/18 126/85   07/10/18 132/84    Weight from Last 3 Encounters:   07/26/18 263 lb 12 oz (119.6 kg)   07/13/18 262 lb 4 oz (119 kg)   07/10/18 263 lb (119.3 kg)              We Performed the Following     DERMATOLOGY REFERRAL        Primary Care Provider Office Phone # Fax  #    Fanta Yee [phone]       [address]  [facility] [place] [address]        Equal Access to Services     REBECCA BOWMAN : Hadii aad cheryl jair Ash, wasuhasda luqjesus, qashawnta kamendozada ez, mary ledezma laLandonlurdes darnell. So [facility] [phone].    ATENCIÓN: Si habla español, tiene a lara disposición servicios gratuitos de asistencia lingüística. Llame al [phone].    We comply with applicable federal civil rights laws and [place] laws. We do not discriminate on the basis of race, color, national origin, age, disability, sex, sexual orientation, or gender identity.            Thank you!     Thank you for choosing [facility]  for your care. Our goal is always to provide you with excellent care. Hearing back from our patients is one way we can continue to improve our services. Please take a few minutes to complete the written survey that you may receive in the mail after your visit with us. Thank you!             Your Updated Medication List - Protect others around you: Learn how to safely use, store and throw away your medicines at www.disposemymeds.org.          This list is accurate as of 7/26/18  8:19 AM.  Always use your most recent med list.                   Brand Name Dispense Instructions for use Diagnosis    ASPIRIN PO      Take 81 mg by mouth daily        metoprolol tartrate 50 MG tablet    LOPRESSOR    30 tablet    Take 0.5 tablets (25 mg) by mouth as needed    Paroxysmal atrial fibrillation (H)

## 2018-07-26 NOTE — PATIENT INSTRUCTIONS
No Incision and drainage needed  Infection resolved  Lump should resolved on it own  Monitor for infection  Sometimes there is a cyst that wont go away and needs compelte removal for that see dermatology   See Primary Dr Yee as planned for physical with fasting labs 8/23

## 2018-08-23 ENCOUNTER — TELEPHONE (OUTPATIENT)
Dept: OTHER | Facility: CLINIC | Age: 48
End: 2018-08-23

## 2018-08-23 ENCOUNTER — OFFICE VISIT (OUTPATIENT)
Dept: FAMILY MEDICINE | Facility: CLINIC | Age: 48
End: 2018-08-23
Payer: COMMERCIAL

## 2018-08-23 VITALS
RESPIRATION RATE: 14 BRPM | SYSTOLIC BLOOD PRESSURE: 110 MMHG | WEIGHT: 270 LBS | DIASTOLIC BLOOD PRESSURE: 64 MMHG | TEMPERATURE: 98 F | BODY MASS INDEX: 31.24 KG/M2 | HEIGHT: 78 IN | OXYGEN SATURATION: 98 % | HEART RATE: 70 BPM

## 2018-08-23 DIAGNOSIS — N52.9 ERECTILE DYSFUNCTION, UNSPECIFIED ERECTILE DYSFUNCTION TYPE: ICD-10-CM

## 2018-08-23 DIAGNOSIS — I83.92 VARICOSE VEINS OF LEFT LOWER EXTREMITY: ICD-10-CM

## 2018-08-23 DIAGNOSIS — K40.90 INGUINAL HERNIA, LEFT: ICD-10-CM

## 2018-08-23 DIAGNOSIS — Z00.00 ROUTINE GENERAL MEDICAL EXAMINATION AT A HEALTH CARE FACILITY: Primary | ICD-10-CM

## 2018-08-23 LAB
ALBUMIN SERPL-MCNC: 4 G/DL (ref 3.4–5)
ALP SERPL-CCNC: 64 U/L (ref 40–150)
ALT SERPL W P-5'-P-CCNC: 40 U/L (ref 0–70)
ANION GAP SERPL CALCULATED.3IONS-SCNC: 7 MMOL/L (ref 3–14)
ANION GAP SERPL CALCULATED.3IONS-SCNC: 8 MMOL/L (ref 3–14)
AST SERPL W P-5'-P-CCNC: 19 U/L (ref 0–45)
BILIRUB SERPL-MCNC: 0.9 MG/DL (ref 0.2–1.3)
BUN SERPL-MCNC: 18 MG/DL (ref 7–30)
BUN SERPL-MCNC: 18 MG/DL (ref 7–30)
CALCIUM SERPL-MCNC: 8.6 MG/DL (ref 8.5–10.1)
CALCIUM SERPL-MCNC: 8.7 MG/DL (ref 8.5–10.1)
CHLORIDE SERPL-SCNC: 108 MMOL/L (ref 94–109)
CHLORIDE SERPL-SCNC: 109 MMOL/L (ref 94–109)
CHOLEST SERPL-MCNC: 126 MG/DL
CO2 SERPL-SCNC: 26 MMOL/L (ref 20–32)
CO2 SERPL-SCNC: 26 MMOL/L (ref 20–32)
CREAT SERPL-MCNC: 1.18 MG/DL (ref 0.66–1.25)
CREAT SERPL-MCNC: 1.19 MG/DL (ref 0.66–1.25)
GFR SERPL CREATININE-BSD FRML MDRD: 65 ML/MIN/1.7M2
GFR SERPL CREATININE-BSD FRML MDRD: 66 ML/MIN/1.7M2
GLUCOSE SERPL-MCNC: 87 MG/DL (ref 70–99)
GLUCOSE SERPL-MCNC: 90 MG/DL (ref 70–99)
HDLC SERPL-MCNC: 39 MG/DL
LDLC SERPL CALC-MCNC: 61 MG/DL
NONHDLC SERPL-MCNC: 87 MG/DL
POTASSIUM SERPL-SCNC: 4.1 MMOL/L (ref 3.4–5.3)
POTASSIUM SERPL-SCNC: 4.2 MMOL/L (ref 3.4–5.3)
PROT SERPL-MCNC: 7 G/DL (ref 6.8–8.8)
SODIUM SERPL-SCNC: 141 MMOL/L (ref 133–144)
SODIUM SERPL-SCNC: 143 MMOL/L (ref 133–144)
TRIGL SERPL-MCNC: 129 MG/DL

## 2018-08-23 PROCEDURE — 99213 OFFICE O/P EST LOW 20 MIN: CPT | Mod: 25 | Performed by: FAMILY MEDICINE

## 2018-08-23 PROCEDURE — 36415 COLL VENOUS BLD VENIPUNCTURE: CPT | Performed by: FAMILY MEDICINE

## 2018-08-23 PROCEDURE — 99396 PREV VISIT EST AGE 40-64: CPT | Performed by: FAMILY MEDICINE

## 2018-08-23 PROCEDURE — 80053 COMPREHEN METABOLIC PANEL: CPT | Performed by: FAMILY MEDICINE

## 2018-08-23 PROCEDURE — 80061 LIPID PANEL: CPT | Performed by: FAMILY MEDICINE

## 2018-08-23 RX ORDER — SILDENAFIL 25 MG/1
25 TABLET, FILM COATED ORAL DAILY PRN
Qty: 12 TABLET | Refills: 11 | Status: SHIPPED | OUTPATIENT
Start: 2018-08-23 | End: 2021-11-16

## 2018-08-23 NOTE — PROGRESS NOTES
Hello!  It was a pleasure to see you in clinic!  Thank you for getting labs done. Everything looks normal, which is good news.     The testing of your blood sugar, kidney function, liver function and electrolytes was normal.     Your cholesterol is fantastic!  Your total cholesterol and LDL are low.      Your triglycerides are low.  Triglycerides are increased by eating lots of sugar, including juice, excess fruit, bread, pasta, rice and cereal, so you must not eat a lot of these things (or you have good genes!)      Your HDL is almost at goal; HDL .HDL protects the heart.  You can increase your HDL by increasing your level of activity. The start of the school year may be just the ticket!    Keep up the good work!    If you have any questions, please contact the clinic or schedule an appointment with me, thank you!    Sincerely,  Dr. Fanta Yee MD  8/23/2018

## 2018-08-23 NOTE — PROGRESS NOTES
SUBJECTIVE:   CC: Braden Lira is an 48 year old male who presents for preventative health visit.     Physical   Annual:     Getting at least 3 servings of Calcium per day:  Yes    Bi-annual eye exam:  Yes    Dental care twice a year:  Yes    Sleep apnea or symptoms of sleep apnea:  Daytime drowsiness    Diet:  Regular (no restrictions)    Frequency of exercise:  2-3 days/week    Duration of exercise:  Less than 15 minutes    Taking medications regularly:  Yes    Medication side effects:  None    Additional concerns today:  YES    Abdominal Hernia      Duration: 4 years, pt has put off surgery    Description (location/character/radiation): mass on left lower abdominal quad       Associated flank pain: None    Intensity:  No pain, palpable mass, no pulsation     Accompanying signs and symptoms:        Fever/Chills: no        Gas/Bloating: no        Nausea/vomitting: no        Diarrhea: no        Dysuria or Hematuria: no     History (previous similar pain/trauma/previous testing): none    Varicose veins  He has large, sometimes painful varicose veins of his left lower leg, especially bothersome around his knee, associated with spider veins of both legs. Getting more symptomatic over the past few months, he'd like to treat them.    Erectile dysfunction  He is having trouble maintaning erections, worse over the past few months, with no difficulty with desire, initiating erections or orgasm.  No triggering causes noted. This is causing some difficulty with intercourse and is bothersome to patient. He is otherwise well, denies chest pain, sob, poor exercise tolerance (he gets up to 15,000 steps at work daily). He denies any skin or hair changes. He does not use illicit drugs and has not tried anything for ED in the past.    Today's PHQ-2 Score:   PHQ-2 ( 1999 [employer]) 8/23/2018   Q1: Little interest or pleasure in doing things 0   Q2: Feeling down, depressed or hopeless 0   PHQ-2 Score 0   Q1: Little interest or pleasure  in doing things Not at all   Q2: Feeling down, depressed or hopeless Not at all   PHQ-2 Score 0       Abuse: Current or Past(Physical, Sexual or Emotional)- No  Do you feel safe in your environment - Yes    Social History   Substance Use Topics     Smoking status: Never Smoker     Smokeless tobacco: Never Used     Alcohol use Yes      Comment: 10 drinks a week     Alcohol Use 8/23/2018   If you drink alcohol do you typically have greater than 3 drinks per day OR greater than 7 drinks per week? No     Last PSA: No results found for: PSA    Reviewed orders with patient. Reviewed health maintenance and updated orders accordingly - Yes  BP Readings from Last 3 Encounters:   08/23/18 110/64   07/26/18 128/80   07/13/18 126/85    Wt Readings from Last 3 Encounters:   08/23/18 270 lb (122.5 kg)   07/26/18 263 lb 12 oz (119.6 kg)   07/13/18 262 lb 4 oz (119 kg)                  Patient Active Problem List   Diagnosis     Family history of malignant neoplasm of breast     Atrial fibrillation (H)     Varicose veins of left lower extremity     Inguinal hernia, left     Erectile dysfunction, unspecified erectile dysfunction type     Past Surgical History:   Procedure Laterality Date     CL AFF SURGICAL PATHOLOGY  9-06    ganglion cyst removed from right wrist       Social History   Substance Use Topics     Smoking status: Never Smoker     Smokeless tobacco: Never Used     Alcohol use Yes      Comment: 10 drinks a week     Family History   Problem Relation Age of Onset     Breast Cancer Mother      re-occuring x 1 BRAC +     Eye Disorder Father      cataracts     C.A.D. Father      MI in 3/09     Diabetes Maternal Grandmother      Eye Disorder Maternal Grandmother      cataracts     C.A.D. Paternal Grandfather      C.A.D. Paternal Uncle      Cerebrovascular Disease Paternal Aunt      Family History Negative Sister          Current Outpatient Prescriptions   Medication Sig Dispense Refill     ASPIRIN PO Take 81 mg by mouth daily   "     metoprolol (LOPRESSOR) 50 MG tablet Take 0.5 tablets (25 mg) by mouth as needed 30 tablet 2     sildenafil (VIAGRA) 25 MG tablet Take 1 tablet (25 mg) by mouth daily as needed 30 min to 4 hrs before sex. Do not use with nitroglycerin, terazosin or doxazosin. 12 tablet 11     No Known Allergies  Recent Labs   Lab Test  12/07/16   0825  01/15/13   1645   LDL   --   58   HDL   --   41   TRIG   --   183*   CR  1.25   --    GFRESTIMATED  62   --    GFRESTBLACK  75   --    POTASSIUM  4.0   --    TSH  0.73   --         Reviewed and updated as needed this visit by clinical staff  Tobacco  Allergies  Meds  Med Hx  Surg Hx  Fam Hx  Soc Hx        Reviewed and updated as needed this visit by Provider          Review of Systems  CONSTITUTIONAL: NEGATIVE for fever, chills, change in weight  INTEGUMENTARY/SKIN: NEGATIVE for worrisome rashes, moles or lesions  EYES: NEGATIVE for vision changes or irritation  ENT: NEGATIVE for ear, mouth and throat problems  RESP: NEGATIVE for significant cough or SOB  CV: NEGATIVE for chest pain, palpitations or peripheral edema  GI: NEGATIVE for nausea, abdominal pain, heartburn, or change in bowel habits   male: negative for dysuria, hematuria, decreased urinary stream, erectile dysfunction, urethral discharge  MUSCULOSKELETAL: NEGATIVE for significant arthralgias or myalgia  NEURO: NEGATIVE for weakness, dizziness or paresthesias  ENDOCRINE: NEGATIVE for temperature intolerance, skin/hair changes  PSYCHIATRIC: NEGATIVE for changes in mood or affect    OBJECTIVE:   /64 (BP Location: Left arm, Patient Position: Sitting)  Pulse 70  Temp 98  F (36.7  C) (Oral)  Resp 14  Ht 6' 6\" (1.981 m)  Wt 270 lb (122.5 kg)  SpO2 98%  BMI 31.2 kg/m2  BP Readings from Last 3 Encounters:   08/23/18 110/64   07/26/18 128/80   07/13/18 126/85      Physical Exam  GENERAL: healthy, alert and no distress  EYES: Eyes grossly normal to inspection, PERRL and conjunctivae and sclerae normal  HENT: " "ear canals and TM's normal, nose and mouth without ulcers or lesions  NECK: no adenopathy, no asymmetry, masses, or scars and thyroid normal to palpation  RESP: lungs clear to auscultation - no rales, rhonchi or wheezes  CV: regular rate and rhythm, normal S1 S2, no S3 or S4, no murmur, click or rub, no peripheral edema and peripheral pulses strong  ABDOMEN: soft, nontender, no hepatosplenomegaly, no masses and bowel sounds normal, no hernia noted today  MS: no gross musculoskeletal defects noted, no edema  LOWER EXTREMITY: ropy mildly tender large varicose veins noted of left lower extremity, spider veins noted of both LOWER EXTREMITIES  SKIN: no suspicious lesions or rashes  NEURO: Normal strength and tone, mentation intact and speech normal  PSYCH: mentation appears normal, affect normal/bright    ASSESSMENT/PLAN:   1. Routine general medical examination at a health care facility  routine  - Basic metabolic panel  (Ca, Cl, CO2, Creat, Gluc, K, Na, BUN)  - Lipid panel reflex to direct LDL Fasting    2. Varicose veins of left lower extremity  Symptomatic   - VASCULAR SURGERY REFERRAL    3. Inguinal hernia, left  Would like this assessed, possibly surgically treated, refer to general surgeon  - GENERAL SURG ADULT REFERRAL    4. Erectile dysfunction  Difficulty maintaining erections. Check cbc and cmp today, start viagra, he is aware insurance may not pay for this.    GFR Estimate   Date Value Ref Range Status   12/07/2016 62 >60 mL/min/1.7m2 Final     Comment:     Non  GFR Calc   11/04/2009 86 >60 mL/min/1.7m2 Final   01/06/2006 74 >60 mL/min/1.7m2 Final       COUNSELING:   Reviewed preventive health counseling, as reflected in patient instructions       Regular exercise       Healthy diet/nutrition    BP Readings from Last 1 Encounters:   08/23/18 110/64     Estimated body mass index is 31.2 kg/(m^2) as calculated from the following:    Height as of this encounter: 6' 6\" (1.981 m).    Weight as of " this encounter: 270 lb (122.5 kg).      Weight management plan: Discussed healthy diet and exercise guidelines and patient will follow up in 12 months in clinic to re-evaluate.     reports that he has never smoked. He has never used smokeless tobacco.      Counseling Resources:  ATP IV Guidelines  Pooled Cohorts Equation Calculator  FRAX Risk Assessment  ICSI Preventive Guidelines  Dietary Guidelines for Americans, 2010  USDA's MyPlate  ASA Prophylaxis  Lung CA Screening    Fanta Yee MD  [facility]  Answers for HPI/ROS submitted by the patient on 8/23/2018   PHQ-2 Score: 0

## 2018-08-23 NOTE — MR AVS SNAPSHOT
After Visit Summary   8/23/2018    Braden Masterson    MRN: [id number]           Patient Information     Date Of Birth          1970        Visit Information        Provider Department      8/23/2018 8:20 AM Fanta Yee MD [facility]        Today's Diagnoses     Routine general medical examination at a health care facility    -  1    Varicose veins of left lower extremity        Inguinal hernia, left          Care Instructions      Preventive Health Recommendations  Male Ages 40 to 49    Yearly exam:             See your health care provider every year in order to  o   Review health changes.   o   Discuss preventive care.    o   Review your medicines if your doctor has prescribed any.    You should be tested each year for STDs (sexually transmitted diseases) if you re at risk.     Have a cholesterol test every 5 years.     Have a colonoscopy (test for colon cancer) if someone in your family has had colon cancer or polyps before age 50.     After age 45, have a diabetes test (fasting glucose). If you are at risk for diabetes, you should have this test every 3 years.      Talk with your health care provider about whether or not a prostate cancer screening test (PSA) is right for you.    Shots: Get a flu shot each year. Get a tetanus shot every 10 years.     Nutrition:    Eat at least 5 servings of fruits and vegetables daily.     Eat whole-grain bread, whole-wheat pasta and brown rice instead of white grains and rice.     Get adequate Calcium and Vitamin D.     Lifestyle    Exercise for at least 150 minutes a week (30 minutes a day, 5 days a week). This will help you control your weight and prevent disease.     Limit alcohol to one drink per day.     No smoking.     Wear sunscreen to prevent skin cancer.     See your dentist every six months for an exam and cleaning.      Surgery for Varicose Veins  If you have large varicose veins, surgery may be the best choice. However, it  will not prevent new varicose veins from forming. Surgery is most often performed in a hospital or surgery center on an outpatient basis.  Varicose vein surgery    Your surgery will be tailored to your needs. Varicose veins may be tied off (ligation), destroyed, or removed. Blood will then flow through the healthy veins. One or more of the following techniques may be used:  Vein stripping and ligation  In more severe cases, the surgeon may tie off and remove veins by making smaller cuts in the skin. Smaller branching veins may also be tied off or removed.  Microphlebectomy or ambulatory phlebectomy  A special hook is used to gently take out a varicose vein through tiny incisions. Microphlebectomy may be done in your healthcare provider s office.  Sclerotherapy  Your healthcare provider will inject the varicose vein with a special chemical that will quickly close the vein from the inside. This is particularly useful for smaller veins.  PIN stripping  All or part of the vein may be removed with a stripping instrument.  Ablation (laser or radiofrequency)  A tiny cut in the skin is made near the varicose vein. A small tube called a catheter is inserted into the vein. Energy or heat released from the catheter tip will make the vein walls collapse and stick together, stopping all blood flow through the vein.   Know about the risks  Your healthcare provider will talk with you about the risks of surgery. These include:    Bleeding or swelling    A sense of numbness, burning, or tingling in areas near the procedure    Edema or swelling in the legs    Clots in the deep veins that may travel to the lungs    Infection    Scarring   Date Last Reviewed: 5/1/2016 2000-2017 [employer]. [address] [place] [address], [place] [address]. All rights reserved. This information is not intended as a substitute for professional medical care. Always follow your healthcare professional's instructions.        Radiofrequency  Ablation (RFA) Treatment for Varicose Veins  Radiofrequency ablation (RFA) is a procedure to treat varicose veins. It uses heat created from radiofrequency (RF).  Varicose veins are swollen, enlarged veins. They happen most often in the legs. Varicose veins can develop when valves in your veins become damaged. This causes problems with blood flow. Over time, too much blood collects in your veins. The veins may bulge, twist, and stand out under your skin. They can also cause symptoms such as aching, cramping, or swelling in your legs.  During RFA treatment, RF heat is sent into your vein through a thin, flexible tube (catheter). This closes off blood flow in the main problem vein.     With RFA treatment, a catheter that contains RF heat is used to seal off the main problem vein.   Getting ready for your treatment  Follow any instructions from your healthcare provider.  Tell your provider if you:    Are pregnant or think you may be pregnant    Are breastfeeding    Smoke or use alcohol on a regular basis    Have any allergies or intolerances to certain medicines. Explain what reaction you have had to these medicines in the past.  Tell your provider about any medicines you are taking. You may need to stop taking all or some of these before the test. This includes:    Medicines that can thin your blood or prevent clotting (anticoagulants)    All prescription medicines    Over-the-counter medicines such as aspirin or ibuprofen    Street drugs    Herbs, vitamins, and other supplements  Follow any directions you re given for not eating or drinking before the procedure.  The day of your treatment  The treatment takes 45 to 60 minutes. The entire treatment (including time to prepare and recover) takes about 1 to 3 hours. You can go home the same day. For the treatment:     You ll lie down on a hospital bed.    An imaging method, such as ultrasound, is used to guide the procedure.    The leg to be treated is injected with  numbing medicine.    Once your leg is numb, a needle makes a small hole (puncture) in the vein to be treated.    The catheter with the RF heat source is inserted into your vein.    More numbing medicine may be injected around your vein.    Once the catheter is in the right position, it is then slowly drawn backward. As the catheter sends out heat, the vein is closed off.    In some cases, other side branch varicose veins may be removed or tied off through a few small cuts (incisions).    When the treatment is done, the catheter is removed. Pressure is applied to the insertion site to stop any bleeding. An elastic compression stocking or a bandage may then be put on your leg.  Recovering at home  Once at home, follow all the instructions you ve been given. Be sure to:    Take all medicines as directed    Care for the catheter insertion site as directed    Check for signs of infection at the catheter insertion site (see below)    Wear elastic stockings or bandages as directed    Keep your legs raised (elevated) as directed    Walk a few times a day    Avoid heavy exercise, lifting, and standing for long periods as advised    Avoid air travel, hot baths, saunas, or whirlpools as advised  Call your healthcare provider  Call your healthcare provider if you have any of the following:    Fever of 100.4 F (38 C) or higher, or as directed by your provider    Chest pain or trouble breathing    Signs of infection at the catheter insertion site. These include increased redness or swelling (inflammation), warmth, increasing pain, bleeding, or bad-smelling discharge.    Severe numbness or tingling in the treated leg    Severe pain or swelling in the treated leg    Follow-up  You ll have a follow-up visit with your healthcare provider within a week. An ultrasound will be done to check for problems, such as blood clots. Your provider will discuss further treatments with you, if needed.  Risks and possible complications   These  include the following:    Bleeding    Infection    Blood clots    Damage to the nerves in the treated area    Irritation or burning of the skin over the treated vein    Treatment doesn't improve the look or the symptoms of the problem veins    Risks of any medicines used during the treatment   Date Last Reviewed: 5/1/2016 2000-2017 [employer]. [address] [place], [place] [address]. All rights reserved. This information is not intended as a substitute for professional medical care. Always follow your healthcare professional's instructions.                Follow-ups after your visit        Additional Services     GENERAL SURG ADULT REFERRAL       Your provider has referred you to: [facility]: [facility] [phone]   [email or web address]  [facility]: [facility] [phone]   [email or web address]  [facility] [place] [facility] [phone]   [email or web address]    Please be aware that coverage of these services is subject to the terms and limitations of your health insurance plan.  Call member services at your health plan with any benefit or coverage questions.      Please bring the following with you to your appointment:    (1) Any X-Rays, CTs or MRIs which have been performed.  Contact the facility where they were done to arrange for  prior to your scheduled appointment.   (2) List of current medications   (3) This referral request   (4) Any documents/labs given to you for this referral            VASCULAR SURGERY REFERRAL       Your provider has referred you to: **[facility] [phone] - Varicose Veins    [email or web address]    Please be aware that coverage of these services is subject to the terms and limitations of your health insurance plan.  Call member services  "at your health plan with any benefit or coverage questions.      Please bring the following with you to your appointment:    (1) Any X-Rays, CTs or MRIs which have been performed.  Contact the facility where they were done to arrange for  prior to your scheduled appointment.    (2) List of current medications   (3) This referral request   (4) Any documents/labs given to you for this referral                  Who to contact     If you have questions or need follow up information about today's clinic visit or your schedule please contact [facility] directly at [phone].  Normal or non-critical lab and imaging results will be communicated to you by [employer], letter or phone within 4 business days after the clinic has received the results. If you do not hear from us within 7 days, please contact the clinic through [employer] or phone. If you have a critical or abnormal lab result, we will notify you by phone as soon as possible.  Submit refill requests through [facility] or call your pharmacy and they will forward the refill request to us. Please allow 3 business days for your refill to be completed.          Additional Information About Your Visit        MyChart Information     [facility] gives you secure access to your electronic health record. If you see a primary care provider, you can also send messages to your care team and make appointments. If you have questions, please call your primary care clinic.  If you do not have a primary care provider, please call [phone] and they will assist you.        Care EveryWhere ID     This is your Care EveryWhere ID. This could be used by other organizations to access your [facility] medical records  [id number]        Your Vitals Were     Pulse Temperature Respirations Height Pulse Oximetry BMI (Body Mass Index)    70 98  F (36.7  C) (Oral) 14 6' 6\" (1.981 m) 98% 31.2 kg/m2       Blood Pressure from Last 3 Encounters:   08/23/18 110/64   07/26/18 128/80 "   07/13/18 126/85    Weight from Last 3 Encounters:   08/23/18 270 lb (122.5 kg)   07/26/18 263 lb 12 oz (119.6 kg)   07/13/18 262 lb 4 oz (119 kg)              We Performed the Following     Basic metabolic panel  (Ca, Cl, CO2, Creat, Gluc, K, Na, BUN)     GENERAL SURG ADULT REFERRAL     Lipid panel reflex to direct LDL Fasting     VASCULAR SURGERY REFERRAL        Primary Care Provider Office Phone # Fax #    Fanta Yee [phone] [address] [facility] [address]        Equal Access to Services     [facility] : Hadii aad ku hadasho Soharry, waaxda luqadaha, qaybta kaalmada adeanayadanielle, mary batista . [facility] [phone].    ATENCIÓN: Si habla español, tiene a lara disposición servicios gratuitos de asistencia lingüística. [facility] [phone].    We comply with applicable federal civil rights laws and [place] laws. We do not discriminate on the basis of race, color, national origin, age, disability, sex, sexual orientation, or gender identity.            Thank you!     Thank you for choosing [facility]  for your care. Our goal is always to provide you with excellent care. Hearing back from our patients is one way we can continue to improve our services. Please take a few minutes to complete the written survey that you may receive in the mail after your visit with us. Thank you!             Your Updated Medication List - Protect others around you: Learn how to safely use, store and throw away your medicines at www.disposemymeds.org.          This list is accurate as of 8/23/18  9:09 AM.  Always use your most recent med list.                   Brand Name Dispense Instructions for use Diagnosis    ASPIRIN PO      Take 81 mg by mouth daily        metoprolol tartrate 50 MG tablet    LOPRESSOR    30 tablet    Take 0.5 tablets (25 mg) by mouth as needed    Paroxysmal atrial fibrillation (H)

## 2018-08-23 NOTE — PATIENT INSTRUCTIONS
Preventive Health Recommendations  Male Ages 40 to 49    Yearly exam:             See your health care provider every year in order to  o   Review health changes.   o   Discuss preventive care.    o   Review your medicines if your doctor has prescribed any.    You should be tested each year for STDs (sexually transmitted diseases) if you re at risk.     Have a cholesterol test every 5 years.     Have a colonoscopy (test for colon cancer) if someone in your family has had colon cancer or polyps before age 50.     After age 45, have a diabetes test (fasting glucose). If you are at risk for diabetes, you should have this test every 3 years.      Talk with your health care provider about whether or not a prostate cancer screening test (PSA) is right for you.    Shots: Get a flu shot each year. Get a tetanus shot every 10 years.     Nutrition:    Eat at least 5 servings of fruits and vegetables daily.     Eat whole-grain bread, whole-wheat pasta and brown rice instead of white grains and rice.     Get adequate Calcium and Vitamin D.     Lifestyle    Exercise for at least 150 minutes a week (30 minutes a day, 5 days a week). This will help you control your weight and prevent disease.     Limit alcohol to one drink per day.     No smoking.     Wear sunscreen to prevent skin cancer.     See your dentist every six months for an exam and cleaning.      Surgery for Varicose Veins  If you have large varicose veins, surgery may be the best choice. However, it will not prevent new varicose veins from forming. Surgery is most often performed in a hospital or surgery center on an outpatient basis.  Varicose vein surgery    Your surgery will be tailored to your needs. Varicose veins may be tied off (ligation), destroyed, or removed. Blood will then flow through the healthy veins. One or more of the following techniques may be used:  Vein stripping and ligation  In more severe cases, the surgeon may tie off and remove veins by  making smaller cuts in the skin. Smaller branching veins may also be tied off or removed.  Microphlebectomy or ambulatory phlebectomy  A special hook is used to gently take out a varicose vein through tiny incisions. Microphlebectomy may be done in your healthcare provider s office.  Sclerotherapy  Your healthcare provider will inject the varicose vein with a special chemical that will quickly close the vein from the inside. This is particularly useful for smaller veins.  PIN stripping  All or part of the vein may be removed with a stripping instrument.  Ablation (laser or radiofrequency)  A tiny cut in the skin is made near the varicose vein. A small tube called a catheter is inserted into the vein. Energy or heat released from the catheter tip will make the vein walls collapse and stick together, stopping all blood flow through the vein.   Know about the risks  Your healthcare provider will talk with you about the risks of surgery. These include:    Bleeding or swelling    A sense of numbness, burning, or tingling in areas near the procedure    Edema or swelling in the legs    Clots in the deep veins that may travel to the lungs    Infection    Scarring   Date Last Reviewed: 5/1/2016 2000-2017 [employer]. [address] [place] [address]. All rights reserved. This information is not intended as a substitute for professional medical care. Always follow your healthcare professional's instructions.        Radiofrequency Ablation (RFA) Treatment for Varicose Veins  Radiofrequency ablation (RFA) is a procedure to treat varicose veins. It uses heat created from radiofrequency (RF).  Varicose veins are swollen, enlarged veins. They happen most often in the legs. Varicose veins can develop when valves in your veins become damaged. This causes problems with blood flow. Over time, too much blood collects in your veins. The veins may bulge, twist, and stand out under your skin. They can also cause  symptoms such as aching, cramping, or swelling in your legs.  During RFA treatment, RF heat is sent into your vein through a thin, flexible tube (catheter). This closes off blood flow in the main problem vein.     With RFA treatment, a catheter that contains RF heat is used to seal off the main problem vein.   Getting ready for your treatment  Follow any instructions from your healthcare provider.  Tell your provider if you:    Are pregnant or think you may be pregnant    Are breastfeeding    Smoke or use alcohol on a regular basis    Have any allergies or intolerances to certain medicines. Explain what reaction you have had to these medicines in the past.  Tell your provider about any medicines you are taking. You may need to stop taking all or some of these before the test. This includes:    Medicines that can thin your blood or prevent clotting (anticoagulants)    All prescription medicines    Over-the-counter medicines such as aspirin or ibuprofen    Street drugs    Herbs, vitamins, and other supplements  Follow any directions you re given for not eating or drinking before the procedure.  The day of your treatment  The treatment takes 45 to 60 minutes. The entire treatment (including time to prepare and recover) takes about 1 to 3 hours. You can go home the same day. For the treatment:     You ll lie down on a hospital bed.    An imaging method, such as ultrasound, is used to guide the procedure.    The leg to be treated is injected with numbing medicine.    Once your leg is numb, a needle makes a small hole (puncture) in the vein to be treated.    The catheter with the RF heat source is inserted into your vein.    More numbing medicine may be injected around your vein.    Once the catheter is in the right position, it is then slowly drawn backward. As the catheter sends out heat, the vein is closed off.    In some cases, other side branch varicose veins may be removed or tied off through a few small cuts  (incisions).    When the treatment is done, the catheter is removed. Pressure is applied to the insertion site to stop any bleeding. An elastic compression stocking or a bandage may then be put on your leg.  Recovering at home  Once at home, follow all the instructions you ve been given. Be sure to:    Take all medicines as directed    Care for the catheter insertion site as directed    Check for signs of infection at the catheter insertion site (see below)    Wear elastic stockings or bandages as directed    Keep your legs raised (elevated) as directed    Walk a few times a day    Avoid heavy exercise, lifting, and standing for long periods as advised    Avoid air travel, hot baths, saunas, or whirlpools as advised  Call your healthcare provider  Call your healthcare provider if you have any of the following:    Fever of 100.4 F (38 C) or higher, or as directed by your provider    Chest pain or trouble breathing    Signs of infection at the catheter insertion site. These include increased redness or swelling (inflammation), warmth, increasing pain, bleeding, or bad-smelling discharge.    Severe numbness or tingling in the treated leg    Severe pain or swelling in the treated leg    Follow-up  You ll have a follow-up visit with your healthcare provider within a week. An ultrasound will be done to check for problems, such as blood clots. Your provider will discuss further treatments with you, if needed.  Risks and possible complications   These include the following:    Bleeding    Infection    Blood clots    Damage to the nerves in the treated area    Irritation or burning of the skin over the treated vein    Treatment doesn't improve the look or the symptoms of the problem veins    Risks of any medicines used during the treatment   Date Last Reviewed: 5/1/2016 2000-2017 [employer]. [address] [place] [address], [place] [address]. All rights reserved. This information is not intended as a substitute for  professional medical care. Always follow your healthcare professional's instructions.

## 2018-08-23 NOTE — TELEPHONE ENCOUNTER
"Referral received via EPIC \"in box\", per  guidelines referral forwarded to [facility].     Yarelis Mendez, IVANN, RN    "

## 2018-10-15 ENCOUNTER — ALLIED HEALTH/NURSE VISIT (OUTPATIENT)
Dept: NURSING | Facility: CLINIC | Age: 48
End: 2018-10-15
Payer: COMMERCIAL

## 2018-10-15 DIAGNOSIS — Z23 NEED FOR PROPHYLACTIC VACCINATION AND INOCULATION AGAINST INFLUENZA: Primary | ICD-10-CM

## 2018-10-15 PROCEDURE — 99207 ZZC NO CHARGE NURSE ONLY: CPT

## 2018-10-15 PROCEDURE — 90686 IIV4 VACC NO PRSV 0.5 ML IM: CPT

## 2018-10-15 PROCEDURE — 90471 IMMUNIZATION ADMIN: CPT

## 2018-10-15 NOTE — MR AVS SNAPSHOT
After Visit Summary   10/15/2018    Braden Solomonopp    MRN: [id number]           Patient Information     Date Of Birth          1970        Visit Information        Provider Department      10/15/2018 3:00 PM  FLU CLINIC NURSE [facility]        Today's Diagnoses     Need for prophylactic vaccination and inoculation against influenza    -  1       Follow-ups after your visit        Who to contact     If you have questions or need follow up information about today's clinic visit or your schedule please contact [facility] directly at [phone].  Normal or non-critical lab and imaging results will be communicated to you by [employer], letter or phone within 4 business days after the clinic has received the results. If you do not hear from us within 7 days, please contact the clinic through [employer] or phone. If you have a critical or abnormal lab result, we will notify you by phone as soon as possible.  Submit refill requests through [employer] or call your pharmacy and they will forward the refill request to us. Please allow 3 business days for your refill to be completed.          Additional Information About Your Visit        MyChart Information     Wishpot gives you secure access to your electronic health record. If you see a primary care provider, you can also send messages to your care team and make appointments. If you have questions, please call your primary care clinic.  If you do not have a primary care provider, please call [phone] and they will assist you.        Care EveryWhere ID     This is your Care EveryWhere ID. This could be used by other organizations to access your [place] medical records  [id number]         Blood Pressure from Last 3 Encounters:   08/23/18 110/64   07/26/18 128/80   07/13/18 126/85    Weight from Last 3 Encounters:   08/23/18 270 lb (122.5 kg)   07/26/18 263 lb 12 oz (119.6 kg)   07/13/18 262 lb 4 oz (119 kg)              We  Performed the Following     FLU VACCINE, SPLIT VIRUS, IM (QUADRIVALENT) [93382]- >3 YRS     Vaccine Administration, Initial [92364]        Primary Care Provider Office Phone # Fax #    Fanta Yee [phone] [address]  [facility] [address]        Equal Access to Services     DION BOWMAN : Hadii aad ku hadasho Soomaali, waaxda luqadaha, qaybta kaalmada adeegyada, waxay idiin hayaan adeeg kharash lamariselan . So [facility] [phone].    ATENCIÓN: Si habla español, tiene a lara disposición servicios gratuitos de asistencia lingüística. Llame al [phone].    We comply with applicable federal civil rights laws and [place] laws. We do not discriminate on the basis of race, color, national origin, age, disability, sex, sexual orientation, or gender identity.            Thank you!     Thank you for choosing [facility]  for your care. Our goal is always to provide you with excellent care. Hearing back from our patients is one way we can continue to improve our services. Please take a few minutes to complete the written survey that you may receive in the mail after your visit with us. Thank you!             Your Updated Medication List - Protect others around you: Learn how to safely use, store and throw away your medicines at www.disposemymeds.org.          This list is accurate as of 10/15/18  3:12 PM.  Always use your most recent med list.                   Brand Name Dispense Instructions for use Diagnosis    ASPIRIN PO      Take 81 mg by mouth daily        metoprolol tartrate 50 MG tablet    LOPRESSOR    30 tablet    Take 0.5 tablets (25 mg) by mouth as needed    Paroxysmal atrial fibrillation (H)       sildenafil 25 MG tablet    VIAGRA    12 tablet    Take 1 tablet (25 mg) by mouth daily as needed 30 min to 4 hrs before sex. Do not use with nitroglycerin, terazosin or doxazosin.    Erectile dysfunction, unspecified erectile dysfunction type, Routine general medical  examination at a health care facility, Varicose veins of left lower extremity, Inguinal hernia, left

## 2018-10-15 NOTE — PROGRESS NOTES

## 2019-11-05 ENCOUNTER — HEALTH MAINTENANCE LETTER (OUTPATIENT)
Age: 49
End: 2019-11-05

## 2020-11-09 ENCOUNTER — ALLIED HEALTH/NURSE VISIT (OUTPATIENT)
Dept: NURSING | Facility: CLINIC | Age: 50
End: 2020-11-09
Payer: COMMERCIAL

## 2020-11-09 DIAGNOSIS — Z23 NEED FOR PROPHYLACTIC VACCINATION AND INOCULATION AGAINST INFLUENZA: Primary | ICD-10-CM

## 2020-11-09 PROCEDURE — 90471 IMMUNIZATION ADMIN: CPT

## 2020-11-09 PROCEDURE — 90686 IIV4 VACC NO PRSV 0.5 ML IM: CPT

## 2020-11-22 ENCOUNTER — HEALTH MAINTENANCE LETTER (OUTPATIENT)
Age: 50
End: 2020-11-22

## 2021-06-04 ENCOUNTER — OFFICE VISIT (OUTPATIENT)
Dept: FAMILY MEDICINE | Facility: CLINIC | Age: 51
End: 2021-06-04
Payer: COMMERCIAL

## 2021-06-04 VITALS
RESPIRATION RATE: 16 BRPM | BODY MASS INDEX: 29.97 KG/M2 | SYSTOLIC BLOOD PRESSURE: 110 MMHG | TEMPERATURE: 98.1 F | DIASTOLIC BLOOD PRESSURE: 84 MMHG | WEIGHT: 259 LBS | HEIGHT: 78 IN

## 2021-06-04 DIAGNOSIS — K40.90 INGUINAL HERNIA, LEFT: ICD-10-CM

## 2021-06-04 DIAGNOSIS — Z12.11 SCREEN FOR COLON CANCER: ICD-10-CM

## 2021-06-04 DIAGNOSIS — I48.0 PAROXYSMAL ATRIAL FIBRILLATION (H): ICD-10-CM

## 2021-06-04 DIAGNOSIS — Z13.6 CARDIOVASCULAR SCREENING; LDL GOAL LESS THAN 160: ICD-10-CM

## 2021-06-04 DIAGNOSIS — Z11.59 NEED FOR HEPATITIS C SCREENING TEST: ICD-10-CM

## 2021-06-04 DIAGNOSIS — Z00.00 ROUTINE GENERAL MEDICAL EXAMINATION AT A HEALTH CARE FACILITY: Primary | ICD-10-CM

## 2021-06-04 DIAGNOSIS — I83.813 VARICOSE VEINS OF BOTH LOWER EXTREMITIES WITH PAIN: ICD-10-CM

## 2021-06-04 LAB
ALBUMIN SERPL-MCNC: 4.2 G/DL (ref 3.4–5)
ALP SERPL-CCNC: 66 U/L (ref 40–150)
ALT SERPL W P-5'-P-CCNC: 36 U/L (ref 0–70)
ANION GAP SERPL CALCULATED.3IONS-SCNC: 6 MMOL/L (ref 3–14)
AST SERPL W P-5'-P-CCNC: 16 U/L (ref 0–45)
BILIRUB SERPL-MCNC: 1.3 MG/DL (ref 0.2–1.3)
BUN SERPL-MCNC: 12 MG/DL (ref 7–30)
CALCIUM SERPL-MCNC: 8.9 MG/DL (ref 8.5–10.1)
CHLORIDE SERPL-SCNC: 108 MMOL/L (ref 94–109)
CHOLEST SERPL-MCNC: 138 MG/DL
CO2 SERPL-SCNC: 26 MMOL/L (ref 20–32)
CREAT SERPL-MCNC: 1.15 MG/DL (ref 0.66–1.25)
GFR SERPL CREATININE-BSD FRML MDRD: 74 ML/MIN/{1.73_M2}
GLUCOSE SERPL-MCNC: 89 MG/DL (ref 70–99)
HCV AB SERPL QL IA: NONREACTIVE
HDLC SERPL-MCNC: 43 MG/DL
LDLC SERPL CALC-MCNC: 68 MG/DL
NONHDLC SERPL-MCNC: 95 MG/DL
POTASSIUM SERPL-SCNC: 4.5 MMOL/L (ref 3.4–5.3)
PROT SERPL-MCNC: 7.1 G/DL (ref 6.8–8.8)
SODIUM SERPL-SCNC: 140 MMOL/L (ref 133–144)
TRIGL SERPL-MCNC: 134 MG/DL

## 2021-06-04 PROCEDURE — 86803 HEPATITIS C AB TEST: CPT | Performed by: FAMILY MEDICINE

## 2021-06-04 PROCEDURE — 36415 COLL VENOUS BLD VENIPUNCTURE: CPT | Performed by: FAMILY MEDICINE

## 2021-06-04 PROCEDURE — 99396 PREV VISIT EST AGE 40-64: CPT | Mod: 25 | Performed by: FAMILY MEDICINE

## 2021-06-04 PROCEDURE — 90471 IMMUNIZATION ADMIN: CPT | Performed by: FAMILY MEDICINE

## 2021-06-04 PROCEDURE — 90750 HZV VACC RECOMBINANT IM: CPT | Performed by: FAMILY MEDICINE

## 2021-06-04 PROCEDURE — 80053 COMPREHEN METABOLIC PANEL: CPT | Performed by: FAMILY MEDICINE

## 2021-06-04 PROCEDURE — 80061 LIPID PANEL: CPT | Performed by: FAMILY MEDICINE

## 2021-06-04 RX ORDER — METOPROLOL TARTRATE 50 MG
25 TABLET ORAL PRN
Qty: 15 TABLET | Refills: 1 | Status: SHIPPED | OUTPATIENT
Start: 2021-06-04 | End: 2021-08-10

## 2021-06-04 ASSESSMENT — ENCOUNTER SYMPTOMS
FREQUENCY: 0
HEMATOCHEZIA: 0
COUGH: 0
SHORTNESS OF BREATH: 0
MYALGIAS: 0
NAUSEA: 0
PALPITATIONS: 0
CONSTIPATION: 0
FEVER: 0
DYSURIA: 0
DIZZINESS: 0
WEAKNESS: 0
EYE PAIN: 0
JOINT SWELLING: 0
NERVOUS/ANXIOUS: 0
HEARTBURN: 0
ARTHRALGIAS: 0
HEADACHES: 0
SORE THROAT: 0
ABDOMINAL PAIN: 0
DIARRHEA: 0
HEMATURIA: 0
PARESTHESIAS: 0
CHILLS: 0

## 2021-06-04 ASSESSMENT — MIFFLIN-ST. JEOR: SCORE: 2168.07

## 2021-06-04 NOTE — PROGRESS NOTES
SUBJECTIVE:   CC: Braden Lira is an 51 year old male who presents for preventative health visit.     Patient has been advised of split billing requirements and indicates understanding: Yes  Healthy Habits:     Getting at least 3 servings of Calcium per day:  Yes    Bi-annual eye exam:  Yes    Dental care twice a year:  Yes    Sleep apnea or symptoms of sleep apnea:  None    Diet:  Regular (no restrictions)    Frequency of exercise:  1 day/week    Duration of exercise:  15-30 minutes    Taking medications regularly:  Yes    Medication side effects:  None    PHQ-2 Total Score: 0    Additional concerns today:  No      Medication Followup of metoprolol 50mg    Taking Medication as prescribed: yes    Side Effects:  None    Medication Helping Symptoms:  yes       Today's PHQ-2 Score:   PHQ-2 ( 1999 Pfizer) 6/4/2021   Q1: Little interest or pleasure in doing things 0   Q2: Feeling down, depressed or hopeless 0   PHQ-2 Score 0   Q1: Little interest or pleasure in doing things Not at all   Q2: Feeling down, depressed or hopeless Not at all   PHQ-2 Score 0       Abuse: Current or Past(Physical, Sexual or Emotional)- No  Do you feel safe in your environment? Yes    Have you ever done Advance Care Planning? (For example, a Health Directive, POLST, or a discussion with a medical provider or your loved ones about your wishes): No, advance care planning information given to patient to review.  Patient plans to discuss their wishes with loved ones or provider.      Social History     Tobacco Use     Smoking status: Never Smoker     Smokeless tobacco: Never Used   Substance Use Topics     Alcohol use: Yes     Comment: 6 drinks a week     If you drink alcohol do you typically have >3 drinks per day or >7 drinks per week? No    Alcohol Use 6/4/2021   Prescreen: >3 drinks/day or >7 drinks/week? No   Prescreen: >3 drinks/day or >7 drinks/week? -   No flowsheet data found.    Last PSA: No results found for: PSA    Reviewed orders with  patient. Reviewed health maintenance and updated orders accordingly - Yes  BP Readings from Last 3 Encounters:   06/04/21 110/84   08/23/18 110/64   07/26/18 128/80    Wt Readings from Last 3 Encounters:   06/04/21 117.5 kg (259 lb)   08/23/18 122.5 kg (270 lb)   07/26/18 119.6 kg (263 lb 12 oz)                  Patient Active Problem List   Diagnosis     CARDIOVASCULAR SCREENING; LDL GOAL LESS THAN 160     Family history of malignant neoplasm of breast     Atrial fibrillation (H)     Varicose veins of left lower extremity     Inguinal hernia, left     Erectile dysfunction, unspecified erectile dysfunction type     Past Surgical History:   Procedure Laterality Date     CL AFF SURGICAL PATHOLOGY  9-06    ganglion cyst removed from right wrist       Social History     Tobacco Use     Smoking status: Never Smoker     Smokeless tobacco: Never Used   Substance Use Topics     Alcohol use: Yes     Comment: 6 drinks a week     Family History   Problem Relation Age of Onset     Breast Cancer Mother         re-occuring x 1 BRAC +     Eye Disorder Father         cataracts     C.A.D. Father         MI in 3/09     Diabetes Maternal Grandmother      Eye Disorder Maternal Grandmother         cataracts     C.A.D. Paternal Grandfather      C.A.D. Paternal Uncle      Cerebrovascular Disease Paternal Aunt      Family History Negative Sister          Current Outpatient Medications   Medication Sig Dispense Refill     ASPIRIN PO Take 81 mg by mouth daily       metoprolol tartrate (LOPRESSOR) 50 MG tablet Take 0.5 tablets (25 mg) by mouth as needed (a fib) 15 tablet 1     sildenafil (VIAGRA) 25 MG tablet Take 1 tablet (25 mg) by mouth daily as needed 30 min to 4 hrs before sex. Do not use with nitroglycerin, terazosin or doxazosin. 12 tablet 11     No Known Allergies  Recent Labs   Lab Test 08/23/18  0852 08/23/18  0851 12/07/16  0825   LDL  --  61  --    HDL  --  39*  --    TRIG  --  129  --    ALT 40  --   --    CR 1.18 1.19 1.25  "  GFRESTIMATED 66 65 62   GFRESTBLACK 80 79 75   POTASSIUM 4.1 4.2 4.0   TSH  --   --  0.73        Reviewed and updated as needed this visit by clinical staff  Tobacco  Allergies  Meds  Problems  Med Hx  Surg Hx  Fam Hx  Soc Hx          Reviewed and updated as needed this visit by Provider     Problems  Med Hx  Surg Hx          Past Medical History:   Diagnosis Date     Atrial fibrillation (H)     cardioversion 12/7/2016     CARDIOVASCULAR SCREENING; LDL GOAL LESS THAN 160 05/09/2010      Past Surgical History:   Procedure Laterality Date     CL AFF SURGICAL PATHOLOGY  9-06    ganglion cyst removed from right wrist     Review of Systems   Constitutional: Negative for chills and fever.   HENT: Negative for congestion, ear pain, hearing loss and sore throat.    Eyes: Negative for pain and visual disturbance.   Respiratory: Negative for cough and shortness of breath.    Cardiovascular: Negative for palpitations and peripheral edema.   Gastrointestinal: Negative for abdominal pain, constipation, diarrhea, heartburn, hematochezia and nausea.   Genitourinary: Negative for dysuria, frequency, genital sores, hematuria and urgency.   Musculoskeletal: Negative for arthralgias, joint swelling and myalgias.   Skin: Negative for rash.   Neurological: Negative for dizziness, weakness, headaches and paresthesias.   Psychiatric/Behavioral: Negative for mood changes. The patient is not nervous/anxious.        OBJECTIVE:   /84 (BP Location: Right arm, Patient Position: Sitting, Cuff Size: Adult Regular)   Temp 98.1  F (36.7  C) (Oral)   Resp 16   Ht 1.981 m (6' 6\")   Wt 117.5 kg (259 lb)   BMI 29.93 kg/m      Physical Exam  GENERAL: healthy, alert and no distress  EYES: Eyes grossly normal to inspection, PERRL and conjunctivae and sclerae normal  HENT: ear canals and TM's normal, nose and mouth without ulcers or lesions  NECK: no adenopathy, no asymmetry, masses, or scars and thyroid normal to palpation  RESP: " "lungs clear to auscultation - no rales, rhonchi or wheezes  CV: regular rate and rhythm, normal S1 S2, no S3 or S4, no murmur, click or rub, no peripheral edema and peripheral pulses strong  ABDOMEN: soft, nontender, no hepatosplenomegaly, no masses and bowel sounds normal  MS: no gross musculoskeletal defects noted, no edema, multiple   SKIN: no suspicious lesions or rashes  NEURO: Normal strength and tone, mentation intact and speech normal  PSYCH: mentation appears normal, affect normal/bright  LYMPH: normal ant/post cervical, supraclavicular nodes      ASSESSMENT/PLAN:   1. Routine general medical examination at a health care facility  routine    2. Paroxysmal atrial fibrillation (H)  Well controlled with as needed use of BB as below  - Lipid panel reflex to direct LDL Fasting  - metoprolol tartrate (LOPRESSOR) 50 MG tablet; Take 0.5 tablets (25 mg) by mouth as needed (a fib)  Dispense: 15 tablet; Refill: 1  - Comprehensive metabolic panel    3. Screen for colon cancer  - GASTROENTEROLOGY ADULT REF PROCEDURE ONLY; Future    4. Need for hepatitis C screening test  - Hepatitis C Screen Reflex to HCV RNA Quant and Genotype    5. CARDIOVASCULAR SCREENING; LDL GOAL LESS THAN 160    I discussed risks/benefits of PSA testing in asymptomatic patients, including risk of false positive and negatives, possible outcomes and follow up including possibly unnecessary prostate biopsies, patient elected NOT to proceed with testing.    6. Inguinal hernia, left  Noted previously, care delayed due to covid, referred to general surgery.    7. Varicose veins of LOWER EXTREMITY  Referred to vascular surgery          Patient has been advised of split billing requirements and indicates understanding: No  COUNSELING:   Reviewed preventive health counseling, as reflected in patient instructions    Estimated body mass index is 29.93 kg/m  as calculated from the following:    Height as of this encounter: 1.981 m (6' 6\").    Weight as of " this encounter: 117.5 kg (259 lb).     Wt Readings from Last 4 Encounters:   06/04/21 117.5 kg (259 lb)   08/23/18 122.5 kg (270 lb)   07/26/18 119.6 kg (263 lb 12 oz)   07/13/18 119 kg (262 lb 4 oz)       He reports that he has never smoked. He has never used smokeless tobacco.      Counseling Resources:  ATP IV Guidelines  Pooled Cohorts Equation Calculator  FRAX Risk Assessment  ICSI Preventive Guidelines  Dietary Guidelines for Americans, 2010  USDA's MyPlate  ASA Prophylaxis  Lung CA Screening    Fanta Yee MD  [facility]

## 2021-06-04 NOTE — NURSING NOTE
Prior to immunization administration, verified patients identity using patient s name and date of birth. Please see Immunization Activity for additional information.     Screening Questionnaire for Adult Immunization    Are you sick today?   No   Do you have allergies to medications, food, a vaccine component or latex?   No   Have you ever had a serious reaction after receiving a vaccination?   No   Do you have a long-term health problem with heart, lung, kidney, or metabolic disease (e.g., diabetes), asthma, a blood disorder, no spleen, complement component deficiency, a cochlear implant, or a spinal fluid leak?  Are you on long-term aspirin therapy?   No   Do you have cancer, leukemia, HIV/AIDS, or any other immune system problem?   No   Do you have a parent, brother, or sister with an immune system problem?   No   In the past 3 months, have you taken medications that affect  your immune system, such as prednisone, other steroids, or anticancer drugs; drugs for the treatment of rheumatoid arthritis, Crohn s disease, or psoriasis; or have you had radiation treatments?   No   Have you had a seizure, or a brain or other nervous system problem?   No   During the past year, have you received a transfusion of blood or blood    products, or been given immune (gamma) globulin or antiviral drug?   No   For women: Are you pregnant or is there a chance you could become       pregnant during the next month?   No   Have you received any vaccinations in the past 4 weeks?   No     Immunization questionnaire answers were all negative.        Per orders of Dr. Yee, injection of shingrix given by Cinthia Kaur. Patient instructed to remain in clinic for 15 minutes afterwards, and to report any adverse reaction to me immediately.       Screening performed by Cinthia Kaur on 6/4/2021 at 7:50 AM.

## 2021-06-04 NOTE — PATIENT INSTRUCTIONS
Shingles vaccine  I strongly recommend getting the shingles vaccine (Shingrix) if you are over age 50, but please check with your insurance to see how much you might have to pay for this vaccine! If you are on most insurance plans including Medicare, it's covered if you get it at a pharmacy but not in a clinic.    This shot will prevent shingles, a painful skin rash that you are at risk for getting if you have ever had chicken pox. Sometimes the pain will last the rest of your life, even after the rash has healed, and there is not much that we can do to relieve the pain. The vaccine is 98% effective at preventing shingles.    Please consider getting this vaccine! You'll need #2 vaccines 2-4 months apart to be protected.        Preventive Health Recommendations  Male Ages 50 - 64    Yearly exam:             See your health care provider every year in order to  o   Review health changes.   o   Discuss preventive care.    o   Review your medicines if your doctor has prescribed any.     Have a cholesterol test every 5 years, or more frequently if you are at risk for high cholesterol/heart disease.     Have a diabetes test (fasting glucose) every three years. If you are at risk for diabetes, you should have this test more often.     Have a colonoscopy at age 50, or have a yearly FIT test (stool test). These exams will check for colon cancer.      Talk with your health care provider about whether or not a prostate cancer screening test (PSA) is right for you.    You should be tested each year for STDs (sexually transmitted diseases), if you re at risk.     Shots: Get a flu shot each year. Get a tetanus shot every 10 years.     Nutrition:    Eat at least 5 servings of fruits and vegetables daily.     Eat whole-grain bread, whole-wheat pasta and brown rice instead of white grains and rice.     Get adequate Calcium and Vitamin D.     Lifestyle    Exercise for at least 150 minutes a week (30 minutes a day, 5 days a week).  This will help you control your weight and prevent disease.     Limit alcohol to one drink per day.     No smoking.     Wear sunscreen to prevent skin cancer.     See your dentist every six months for an exam and cleaning.     See your eye doctor every 1 to 2 years.

## 2021-06-11 NOTE — RESULT ENCOUNTER NOTE
Hello!  It was a pleasure to see you in clinic!  Thank you for getting labs done. Everything looks normal, which is good news.     Your screening test was negative for Hepatitis C, which is great news! You have NOT been infected with this liver disease.  You do not need any further testing for Hepatitis C.     The testing of your blood sugar, kidney function, liver function and electrolytes was normal.     Your cholesterol is fantastic!  Your total cholesterol is low, and your HDL, or good cholesterol, is very high, which is great, as this protects your heart from heart disease and shows that you get a good amount of exercise.      Your triglycerides are also low.  Triglycerides are increased by eating lots of sugar, including juice, excess fruit, bread, pasta, rice and cereal, so you must not eat a lot of these things (or you have good genes!)      Keep up the good work!    If you have any questions, please contact the clinic or schedule an appointment with me, thank you!    Sincerely,  Dr. Fanta Yee MD  6/11/2021

## 2021-09-19 ENCOUNTER — HEALTH MAINTENANCE LETTER (OUTPATIENT)
Age: 51
End: 2021-09-19

## 2021-10-05 ENCOUNTER — OFFICE VISIT (OUTPATIENT)
Dept: VASCULAR SURGERY | Facility: CLINIC | Age: 51
End: 2021-10-05
Attending: FAMILY MEDICINE
Payer: COMMERCIAL

## 2021-10-05 DIAGNOSIS — I83.813 VARICOSE VEINS OF BOTH LOWER EXTREMITIES WITH PAIN: ICD-10-CM

## 2021-10-05 DIAGNOSIS — I83.893 VARICOSE VEINS OF LEG WITH SWELLING, BILATERAL: Primary | ICD-10-CM

## 2021-10-05 PROCEDURE — 99203 OFFICE O/P NEW LOW 30 MIN: CPT | Performed by: SURGERY

## 2021-10-05 NOTE — LETTER
10/5/2021         RE: Braden Lira  [address]  [facility] [address]        Dear Colleague,    Thank you for referring your patient, Braden Lira, to the [facility] [place]. Please see a copy of my visit note below.    [facility] CONSULTATION    HPI:    Braden Lira is a pleasant 51 year old male referred by Dr. Fanta Yee for evaluation of left greater than right lower extremity varicose veins with pain.  Varicose veins developed it is early 20s.  He describes pain in his calf as aching, tiredness and heaviness, worse after is been on his feet for long periods of time, not sure what makes them better.  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  He also develops excessive fatigue in his legs.  Swelling occurs on a regular basis in the left lower extremity.    He has no history of deep vein thrombosis, superficial thrombophlebitis or hemorrhage.  His family history significant for varicose veins in his mother.      PAST MEDICAL HISTORY:   Past Medical History:   Diagnosis Date     Atrial fibrillation (H)     cardioversion 12/7/2016     CARDIOVASCULAR SCREENING; LDL GOAL LESS THAN 160 05/09/2010       PAST SURGICAL HISTORY:   Past Surgical History:   Procedure Laterality Date     CL AFF SURGICAL PATHOLOGY  9-06    ganglion cyst removed from right wrist       FAMILY HISTORY:   Family History   Problem Relation Age of Onset     Breast Cancer Mother         re-occuring x 1 BRAC +     Eye Disorder Father         cataracts     C.A.D. Father         MI in 3/09     Diabetes Maternal Grandmother      Eye Disorder Maternal Grandmother         cataracts     C.A.D. Paternal Grandfather      C.A.D. Paternal Uncle      Cerebrovascular Disease Paternal Aunt      Family History Negative Sister        SOCIAL HISTORY:   Social History     Tobacco Use     Smoking status: Never Smoker     Smokeless tobacco: Never Used   Substance Use Topics     Alcohol use: Yes      Comment: 6 drinks a week       REVIEW OF SYSTEMS: Review Of Systems  Skin: negative  Eyes: negative  Ears/Nose/Throat: negative  Respiratory: No shortness of breath, dyspnea on exertion, cough, or hemoptysis  Cardiovascular: negative  Gastrointestinal: negative  Genitourinary: negative  Musculoskeletal: Left foot pain, left leg swelling, pain  Neurologic: negative  Psychiatric: negative  Hematologic/Lymphatic/Immunologic: negative  Endocrine: negative      Vital signs:  There were no vitals taken for this visit.    Current Outpatient Medications   Medication Sig Dispense Refill     ASPIRIN PO Take 81 mg by mouth daily       metoprolol tartrate (LOPRESSOR) 50 MG tablet TAKE 0.5 TABLETS (25 MG) BY MOUTH AS NEEDED (A FIB) 15 tablet 4     sildenafil (VIAGRA) 25 MG tablet Take 1 tablet (25 mg) by mouth daily as needed 30 min to 4 hrs before sex. Do not use with nitroglycerin, terazosin or doxazosin. 12 tablet 11       PHYSICAL EXAM:  General: Pleasant, NAD.   HEENT: Normocephalic, atraumatic, external ears and nose normal.   Respiratory: Normal respiratory effort.   Cardiovascular: Pulse is regular.   Musculoskeletal: Gait and station normal.  The joints of his fingers and toes without deformity.  There is no cyanosis of his nailbeds.   EXTREMITIES: Right lower extremity: 6 mm varicose veins of the distal lateral right leg.  Trace edema of the right ankle.    Left lower extremity: 8 to 10 mm varicosities coursing from the mid medial left thigh, down the medial thigh, medial calf.  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  1+ edema of the left ankle.  Hyperpigmentation of the left mid leg from previous trauma..    PULSES: R/L (3=normal pulse, 0=no palpable pulse) dorsalis pedis: 3/2; posterior tibial: 3/3.      Neurologic: Grossly normal  Psychiatric: Mood, affect, judgment and insight are normal     ASSESSMENT:  Advanced venous stasis disease left lower extremity.  We discussed the anatomy of the lower extremity  veins as well as the pathophysiology of venous insufficiency.  The option of continued conservative measures with the addition of compression hose was discussed.  Risk of conservative management include superficial thrombophlebitis, bleeding and progression of the disease process.    He has not worn compression obtain a significant stent, therefore we will fit him with compression hose today.  If after a period of conservative management his symptoms have not improved, he may be a candidate for endovenous ablation of axial incompetence and phlebectomies to treat the large varicosities of his thigh and calf.  Details of procedure including risks of bleeding, infection, nerve injury, scarring, hyperpigmentation, deep vein thrombosis were discussed.  He voiced understanding of our discussion and is anxious to begin with compression therapy.  His questions were answered.    PLAN:  Left lower extremity venous competency study.  Appropriate conservative management.     Bbo Loyola MD    Dictated using Dragon voice recognition software which may result in transcription errors          [facility] NEW PATIENT:                  Again, thank you for allowing me to participate in the care of your patient.        Sincerely,        Bob Loyola MD

## 2021-10-05 NOTE — PROGRESS NOTES
VEINSOLUTIONS CONSULTATION    HPI:    Braden Lira is a pleasant 51 year old male referred by Dr. Fanta Yee for evaluation of left greater than right lower extremity varicose veins with pain.  Varicose veins developed it is early 20s.  He describes pain in his calf as aching, tiredness and heaviness, worse after is been on his feet for long periods of time, not sure what makes them better.  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  He also develops excessive fatigue in his legs.  Swelling occurs on a regular basis in the left lower extremity.    He has no history of deep vein thrombosis, superficial thrombophlebitis or hemorrhage.  His family history significant for varicose veins in his mother.      PAST MEDICAL HISTORY:   Past Medical History:   Diagnosis Date     Atrial fibrillation (H)     cardioversion 12/7/2016     CARDIOVASCULAR SCREENING; LDL GOAL LESS THAN 160 05/09/2010       PAST SURGICAL HISTORY:   Past Surgical History:   Procedure Laterality Date     CL AFF SURGICAL PATHOLOGY  9-06    ganglion cyst removed from right wrist       FAMILY HISTORY:   Family History   Problem Relation Age of Onset     Breast Cancer Mother         re-occuring x 1 BRAC +     Eye Disorder Father         cataracts     C.A.D. Father         MI in 3/09     Diabetes Maternal Grandmother      Eye Disorder Maternal Grandmother         cataracts     C.A.D. Paternal Grandfather      C.A.D. Paternal Uncle      Cerebrovascular Disease Paternal Aunt      Family History Negative Sister        SOCIAL HISTORY:   Social History     Tobacco Use     Smoking status: Never Smoker     Smokeless tobacco: Never Used   Substance Use Topics     Alcohol use: Yes     Comment: 6 drinks a week       REVIEW OF SYSTEMS: Review Of Systems  Skin: negative  Eyes: negative  Ears/Nose/Throat: negative  Respiratory: No shortness of breath, dyspnea on exertion, cough, or hemoptysis  Cardiovascular:  negative  Gastrointestinal: negative  Genitourinary: negative  Musculoskeletal: Left foot pain, left leg swelling, pain  Neurologic: negative  Psychiatric: negative  Hematologic/Lymphatic/Immunologic: negative  Endocrine: negative      Vital signs:  There were no vitals taken for this visit.    Current Outpatient Medications   Medication Sig Dispense Refill     ASPIRIN PO Take 81 mg by mouth daily       metoprolol tartrate (LOPRESSOR) 50 MG tablet TAKE 0.5 TABLETS (25 MG) BY MOUTH AS NEEDED (A FIB) 15 tablet 4     sildenafil (VIAGRA) 25 MG tablet Take 1 tablet (25 mg) by mouth daily as needed 30 min to 4 hrs before sex. Do not use with nitroglycerin, terazosin or doxazosin. 12 tablet 11       PHYSICAL EXAM:  General: Pleasant, NAD.   HEENT: Normocephalic, atraumatic, external ears and nose normal.   Respiratory: Normal respiratory effort.   Cardiovascular: Pulse is regular.   Musculoskeletal: Gait and station normal.  The joints of his fingers and toes without deformity.  There is no cyanosis of his nailbeds.   EXTREMITIES: Right lower extremity: 6 mm varicose veins of the distal lateral right leg.  Trace edema of the right ankle.    Left lower extremity: 8 to 10 mm varicosities coursing from the mid medial left thigh, down the medial thigh, medial calf.  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  1+ edema of the left ankle.  Hyperpigmentation of the left mid leg from previous trauma..    PULSES: R/L (3=normal pulse, 0=no palpable pulse) dorsalis pedis: 3/2; posterior tibial: 3/3.      Neurologic: Grossly normal  Psychiatric: Mood, affect, judgment and insight are normal     ASSESSMENT:  Advanced venous stasis disease left lower extremity.  We discussed the anatomy of the lower extremity veins as well as the pathophysiology of venous insufficiency.  The option of continued conservative measures with the addition of compression hose was discussed.  Risk of conservative management include superficial  thrombophlebitis, bleeding and progression of the disease process.    He has not worn compression obtain a significant stent, therefore we will fit him with compression hose today.  If after a period of conservative management his symptoms have not improved, he may be a candidate for endovenous ablation of axial incompetence and phlebectomies to treat the large varicosities of his thigh and calf.  Details of procedure including risks of bleeding, infection, nerve injury, scarring, hyperpigmentation, deep vein thrombosis were discussed.  He voiced understanding of our discussion and is anxious to begin with compression therapy.  His questions were answered.    PLAN:  Left lower extremity venous competency study.  Appropriate conservative management.     Bob Loyola MD    Dictated using Dragon voice recognition software which may result in transcription errors          VEINSOLUTIONS NEW PATIENT:

## 2021-11-16 DIAGNOSIS — Z00.00 ROUTINE GENERAL MEDICAL EXAMINATION AT A HEALTH CARE FACILITY: ICD-10-CM

## 2021-11-16 DIAGNOSIS — I83.92 VARICOSE VEINS OF LEFT LOWER EXTREMITY: ICD-10-CM

## 2021-11-16 DIAGNOSIS — N52.9 ERECTILE DYSFUNCTION, UNSPECIFIED ERECTILE DYSFUNCTION TYPE: ICD-10-CM

## 2021-11-16 DIAGNOSIS — K40.90 INGUINAL HERNIA, LEFT: ICD-10-CM

## 2021-11-16 RX ORDER — SILDENAFIL 25 MG/1
25 TABLET, FILM COATED ORAL DAILY PRN
Qty: 12 TABLET | Refills: 11 | Status: SHIPPED | OUTPATIENT
Start: 2021-11-16

## 2021-11-16 NOTE — TELEPHONE ENCOUNTER
Reason for Call:  Medication or medication refill:    Do you use a [facility]?  Name of the pharmacy and phone number for the current request:  [facility] [address]    Name of the medication requested: sildenafil (VIAGRA) 25 MG tablet    Other request: Pt would Like this prescription before this Thursday (21)  as [place] is [place]. Per pt, Phamracy told pt his prescription  would need PCP to renew. Please call pt with confirmation once sent to pharmacy.    Can we leave a detailed message on this number? YES    Phone number patient can be reached at: Home number on file [phone] (home)    Best Time: ANY    Call taken on 2021 at 3:45 PM by Chela Patel

## 2021-11-16 NOTE — TELEPHONE ENCOUNTER
Dr. Yee-Please review and sign if agree:  1. Patient's medication has not been prescribed since 8/23/18   A. Would you prefer discussion in office visit before patient restarts this medication?    Thank you!  IVAN LingN, RN  [facility]

## 2021-12-07 ENCOUNTER — ANCILLARY PROCEDURE (OUTPATIENT)
Dept: ULTRASOUND IMAGING | Facility: CLINIC | Age: 51
End: 2021-12-07
Attending: SURGERY
Payer: COMMERCIAL

## 2021-12-07 ENCOUNTER — OFFICE VISIT (OUTPATIENT)
Dept: VASCULAR SURGERY | Facility: CLINIC | Age: 51
End: 2021-12-07
Attending: SURGERY
Payer: COMMERCIAL

## 2021-12-07 DIAGNOSIS — I83.893 VARICOSE VEINS OF LEG WITH SWELLING, BILATERAL: ICD-10-CM

## 2021-12-07 DIAGNOSIS — I83.813 VARICOSE VEINS OF BOTH LOWER EXTREMITIES WITH PAIN: Primary | ICD-10-CM

## 2021-12-07 DIAGNOSIS — I83.813 VARICOSE VEINS OF BOTH LOWER EXTREMITIES WITH PAIN: ICD-10-CM

## 2021-12-07 PROCEDURE — 99213 OFFICE O/P EST LOW 20 MIN: CPT | Performed by: SURGERY

## 2021-12-07 PROCEDURE — 93971 EXTREMITY STUDY: CPT | Mod: LT | Performed by: SURGERY

## 2021-12-07 NOTE — LETTER
12/7/2021         RE: Braden Lira  [address]  [place] [address]        Dear Colleague,    Thank you for referring your patient, Braden Lira, to the [facility] [place]. Please see a copy of my visit note below.    [facility] [place] Progress Note    Braden Lira presents in follow-up of left greater than right lower extremity varicose veins with pain and swelling.  See my consultation of 10/5/2021 for details.  He returns today for left lower extremity venous competency study, the results of which we will discuss.    Physical Exam   General: Pleasant male in no acute distress.  Blood pressure 117/76, pulse 79  Extremities: Left lower extremity: 8 to 10 mm varicosities coursing from the mid medial left thigh, medial to the left knee, onto the medial left leg.  1+ edema of the left ankle with corona phlebectatica about the medial left ankle.  Traumatic hyperpigmentation on the left mid leg.    Ultrasound:  Left lower extremity: No evidence of deep vein thrombosis.  Left common femoral, proximal femoral and popliteal veins are incompetent.  The mid and distal femoral veins are competent.  The left great saphenous vein is incompetent from the saphenofemoral junction to the mid thigh, measures 16.4 mm in diameter at the proximal thigh with reflux time of 5.1 seconds.  It is the source of multiple incompetent vein branches measuring up to 13.3 mm in diameter with reflux time of 5.5 seconds.  The small saphenous vein and Vein of Giacomini are competent.  The left anterior accessory saphenous vein is not visualized.  There is a 3.5 mm diameter incompetent perforating vein 10 cm from the left main malleolus communicating with the left great saphenous vein and a 3.7 mm diameter incompetent perforating vein on the posterior calf 24 cm below the knee crease communicating with the small saphenous vein and an incompetent vein branch.    Right lower extremity: The right  common femoral vein demonstrates normal phasicity, compression and augmentation with no evidence of deep vein thrombosis.    Assessment:  CEAP 3 left lower extremity chronic venous insufficiency with combined deep and superficial incompetence.  We discussed the option of continued conservative management with compression, leg elevation, dietary measures and exercise.  Risks of superficial thrombophlebitis, bleeding and progression of disease process were discussed.    The patient has been wearing compression hose since October 5 and notes significant improvement in the swelling of his lower extremities.  He will continue conservative measures to see if this will be adequate measures to control his symptoms.    If his symptoms are not well controlled with compression in addition to exercise, dietary measures and leg elevation, he is a candidate for endovenous ablation of the left great saphenous vein with phlebectomies.  Details of procedure including risks of bleeding, infection, nerve injury, scarring, hyperpigmentation, deep vein thrombosis, recanalization of the great saphenous vein and recurrent varicose veins were discussed.  Patient voiced understanding and his questions were answered.    Plan:  Follow-up conservative management after an appropriate period of time.    Bob Loyola MD    Dictated using Dragon voice recognition software which may result in transcription errors              Again, thank you for allowing me to participate in the care of your patient.        Sincerely,        Bob Loyola MD

## 2022-01-14 ENCOUNTER — VIRTUAL VISIT (OUTPATIENT)
Dept: VASCULAR SURGERY | Facility: CLINIC | Age: 52
End: 2022-01-14
Payer: COMMERCIAL

## 2022-01-14 DIAGNOSIS — I83.812 VARICOSE VEINS OF LEFT LOWER EXTREMITY WITH PAIN: Primary | ICD-10-CM

## 2022-01-14 DIAGNOSIS — I83.813 VARICOSE VEINS OF BOTH LOWER EXTREMITIES WITH PAIN: ICD-10-CM

## 2022-01-14 PROCEDURE — 99212 OFFICE O/P EST SF 10 MIN: CPT | Mod: 95 | Performed by: SURGERY

## 2022-01-14 NOTE — LETTER
1/14/2022         RE: Braden Lira  [address]  [place] [address]        Dear Colleague,    Thank you for referring your patient, Braden Lria, to the [facility]. Please see a copy of my visit note below.    Braden is a 51 year old who is being evaluated via a billable video visit.      How would you like to obtain your AVS? MyChart  If the video visit is dropped, the invitation should be resent by: Text to cell phone: [phone]  Will anyone else be joining your video visit? No      Telephone start Time: 11:45 AM      Telephone-Visit Details    Type of service:  Telephone visit    Telephone end Time:11:54 AM    Originating Location (pt. Location): Home    Distant Location (provider location):  [facility]     Platform used for telephone visit: [employer]     [facility] telephone 3-month follow-up  Braden Lira has now completed 3 months of conservative management including exercise, leg elevation, dietary measures and compression.  He admits that the compression has improved his symptoms but he does not feel that wearing the compression for the rest of his life will be a good long-term option for him.  As soon as he removes the stocking, his pain returns.  Also, he is concerned that there has been so much progression/enlargement of the varicose veins over the last year or so.    Assessment  Persistent left leg pain and varicose veins status post conservative treatment.  His left great saphenous vein is incompetent and dilated and is a source of large varicose veins on his left lower extremity.  He is a candidate for endovenous ablation of his left great saphenous vein and wishes to have concomitant phlebectomies given the size of his varicose veins.    Plan  Radiofrequency ablation left right saphenous vein with medically necessary phlebectomies in early March 2022    LINN Loyola MD    Dictated using Ema  voice recognition software which may result in transcription errors          Again, thank you for allowing me to participate in the care of your patient.        Sincerely,        Bob Loyola MD

## 2022-01-14 NOTE — PROGRESS NOTES
Braden is a 51 year old who is being evaluated via a billable video visit.      How would you like to obtain your AVS? MyChart  If the video visit is dropped, the invitation should be resent by: Text to cell phone: [phone]  Will anyone else be joining your video visit? No      Telephone start Time: 11:45 AM      Telephone-Visit Details    Type of service:  Telephone visit    Telephone end Time:11:54 AM    Originating Location (pt. Location): Home    Distant Location (provider location):  [facility] [place]     Platform used for telephone visit: [employer]     [facility] [place] telephone 3-month follow-up  Braden Lira has now completed 3 months of conservative management including exercise, leg elevation, dietary measures and compression.  He admits that the compression has improved his symptoms but he does not feel that wearing the compression for the rest of his life will be a good long-term option for him.  As soon as he removes the stocking, his pain returns.  Also, he is concerned that there has been so much progression/enlargement of the varicose veins over the last year or so.    Assessment  Persistent left leg pain and varicose veins status post conservative treatment.  His left great saphenous vein is incompetent and dilated and is a source of large varicose veins on his left lower extremity.  He is a candidate for endovenous ablation of his left great saphenous vein and wishes to have concomitant phlebectomies given the size of his varicose veins.    Plan  Radiofrequency ablation left right saphenous vein with medically necessary phlebectomies in early March 2022    LINN Loyola MD    Dictated using Dragon voice recognition software which may result in transcription errors

## 2022-01-25 ENCOUNTER — TELEPHONE (OUTPATIENT)
Dept: VASCULAR SURGERY | Facility: CLINIC | Age: 52
End: 2022-01-25
Payer: COMMERCIAL

## 2022-01-25 DIAGNOSIS — I83.812 VARICOSE VEINS OF LEFT LOWER EXTREMITY WITH PAIN: Primary | ICD-10-CM

## 2022-01-25 NOTE — TELEPHONE ENCOUNTER
Compression Hose Order  Pt would like 1 pair(s) of black, closed-toe, thigh high, 20-30mmhg compression hose.    Please fax patient's Rx to the [facility] store.    Patient has procedure on 3/4/22 with Dr. Loyola.    Ruba Finch  [facility]  Vein Clinic

## 2022-01-25 NOTE — TELEPHONE ENCOUNTER
Per pt request, faxed their compression hose Rx to [facility] at [phone].   Pt would like one pair(s) of black, closed-toe, thigh high, 20-30mmhg compression hose. Fax confirmed.    This is a custom order. Per [facility] they will provide the patient the number to O&P for this order.    Pt aware they will be shipped to their home address.    Waleska Smyth RN

## 2022-02-23 ENCOUNTER — TELEPHONE (OUTPATIENT)
Dept: VASCULAR SURGERY | Facility: CLINIC | Age: 52
End: 2022-02-23
Payer: COMMERCIAL

## 2022-02-23 DIAGNOSIS — I83.812 VARICOSE VEINS OF LEFT LOWER EXTREMITY WITH PAIN: Primary | ICD-10-CM

## 2022-02-23 NOTE — TELEPHONE ENCOUNTER
Vein Clinic Preoperative Nurse Call    Procedure: Left leg VNUS closure GSV(med nec), >20 stab phlebs(med nec)   Date: 3/4/22  Surgeon: Milla  Time: 0900  Check in time: 0745    Called and left detailed message. Informed patient: when to check in (0745) to sign consent, to bring their preop medications in their original bottle with them (3mg ativan, 0.1mg clonidine). Patient will take the medications after signing the consent to the procedure. Instructed patient to wear a mask, wear loose-fitting comfortable clothing, and bring their compression hose. Ensured patient has a /someone that will be responsible for them the rest of the day. Visitors are allowed in the clinic, but they would need to stay in an exam room or wait in the parking lot or leave. If  does leave, IF POSSIBLE, we ask that they do not go more than 15-20 mins from our clinic. Once procedure is completed, we will keep patient in recovery for 30-45 mins, and call  with aftercare instructions. Informed patient, that if possible, they should sit in the backseat to elevate their leg on the ride home.    Pt needs thigh-high compression hose for procedure. Fax sent to [facility] on 1/25/22. Status of the hose: unknown. Instructed patient to call back to let us know if he has received his compression hose.      Special instructions: none.     Special COVID-19 instructions: Patient aware they need to wear a mask to our clinic and during their procedure. Patient aware that their  can come in with them, but would need to stay in an exam room during the procedure or wait in the parking lot or leave. Nurse will call pt's  when procedure is over.    Patient understands that if they have any of the following symptoms (fever, cough, shortness of breath, rash), they need to notify us immediately to cancel their procedure and will have to reschedule for a later date.    Patient is in agreement with preoperative information and has no  further questions.    Ruba Mccullough RN  2/23/2022

## 2022-02-24 RX ORDER — LORAZEPAM 1 MG/1
TABLET ORAL
Qty: 3 TABLET | Refills: 0 | Status: SHIPPED | OUTPATIENT
Start: 2022-02-24 | End: 2022-03-07

## 2022-02-24 RX ORDER — CLONIDINE HYDROCHLORIDE 0.1 MG/1
TABLET ORAL
Qty: 1 TABLET | Refills: 0 | Status: SHIPPED | OUTPATIENT
Start: 2022-02-24 | End: 2022-03-07

## 2022-03-02 NOTE — TELEPHONE ENCOUNTER
Spoke with patient regarding pre-op information. Clarified that patient does have his thigh high compression hose and has already picked up medications to bring with the day of procedure. Informed patient he will need someone to stay with him after procedure due to the medications he will be taking. Verbalized understanding of all information.  Address of Vein Clinic provided. No further questions or concerns from patient.

## 2022-03-04 ENCOUNTER — OFFICE VISIT (OUTPATIENT)
Dept: VASCULAR SURGERY | Facility: CLINIC | Age: 52
End: 2022-03-04
Payer: COMMERCIAL

## 2022-03-04 VITALS — SYSTOLIC BLOOD PRESSURE: 110 MMHG | OXYGEN SATURATION: 98 % | HEART RATE: 70 BPM | DIASTOLIC BLOOD PRESSURE: 59 MMHG

## 2022-03-04 DIAGNOSIS — I83.812 VARICOSE VEINS OF LEFT LOWER EXTREMITY WITH PAIN: Primary | ICD-10-CM

## 2022-03-04 PROCEDURE — 37766 PHLEB VEINS - EXTREM 20+: CPT | Mod: LT | Performed by: SURGERY

## 2022-03-04 PROCEDURE — 36475 ENDOVENOUS RF 1ST VEIN: CPT | Mod: LT | Performed by: SURGERY

## 2022-03-04 RX ORDER — HYDROCODONE BITARTRATE AND ACETAMINOPHEN 5; 325 MG/1; MG/1
3 TABLET ORAL EVERY 6 HOURS PRN
Qty: 3 TABLET | Refills: 0 | Status: SHIPPED | OUTPATIENT
Start: 2022-03-04 | End: 2022-03-07

## 2022-03-04 NOTE — LETTER
3/4/2022         RE: Braden Lira  [address]  [place] [address]        Dear Colleague,    Thank you for referring your patient, Braden Lira, to the [facility] CL[facility] [place]. Please see a copy of my visit note below.        Vein Clinic Procedure Note    Indications:  Left leg varicose veins with pain and swelling; symptoms recalcitrant to conservative measures    Procedure:  1. Radiofrequency ablation left great saphenous vein  2. Multiple stab phlebectomies left extremity-medically necessary (greater than 20 stabs)    Surgeon  BIANCA Loyola MD    Procedure Description  Details of the procedure including risks of bleeding, infection, nerve injury, scarring, hyperpigmentation, deep vein thrombosis, recanalization of the great saphenous vein and recurrent varicose veins all discussed.  The patient voiced understanding and wished to proceed.  Informed consent was obtained.     I had the patient stand and marked varicosities coursing from the mid medial thigh down the anteromedial thigh, across the left patella, medial to the left knee and onto the medial left calf, extending posteriorly and laterally.  These terminated on the dorsal lateral aspect of the left foot, all of which were marked with an indelible marker.  We then proceeded the operating room, had the patient lie supine on the operating table, then prepped and draped the left lower extremity sterilely.    We took a timeout to confirm the appropriate operative site and procedure: Radiofrequency ablation of the left great saphenous vein with medically necessary phlebectomies    VNUS Closure  I imaged the left lower extremity easily identifying the left great saphenous vein.  I infiltrated the skin overlying the vein approximately 8 cm distal to the knee, just distal to the last minute tributary, placed a micropuncture needle into the vein followed by a micropuncture guidewire and a 7 Persian sheath.    We passed the closure  fast device through the sheath, positioning of the tip of the device 2.38 centimeters from the saphenofemoral junction under ultrasound guidance with the operating table in Trendelenburg position.  Tumescent anesthetic was injected along the course of the vein under ultrasound guidance with care to confirm catheter tip position prior to infiltrating the tissues in this location.  The same tumescent anesthetic was also injected around each of the marked varicosities.    After allowing the block to take effect and after confirming appropriate position, I applied compression to the proximal thigh great saphenous vein with the ultrasound probe and additional width 2 fingerbreadths treating the first three 7 cm increments of the vein with 2 RF sessions each.  The remaining vein was treated with 1 RF session to each 7cm increment with the exception of segments of vein where energy readings were higher requiring additional treatments.  We treated down to approximately 8 cm distal to the knee.  The patient was comfortable throughout.    After completing the pullback, I reimaged the vein and the vein to be non-compressible and closed.  The saphenofemoral junction and common femoral veins were fully compressible and free of thrombus. The sheath and the catheter were removed and hemostasis secured with pressure.    Phlebectomies  We made stab wounds beside each of the marked varicosities with 11 scalpel, retrieved the veins with either vein hooks or olman hooks, clamped them with mosquito clamps and avulsed them.  Hemostasis was secured with pressure.  Into these veins were quite thick-walled and were more difficult to remove.    After completing the phlebectomies, the leg was cleaned with saline solution and petroleum jelly was applied to the skin.  The leg was then dressed with ABD pads, cast padding and an Ace bandage from the toes to the groin.   We observed the patient for 30 minutes to ensure excellent hemostasis then  took the patient to their ride in a wheelchair.  Post procedure instructions were given to the patient and his wife in verbal and written form.  They both voiced understanding and their questions were answered.    The patient tolerated the procedure well without evidence of allergic reaction or other complications and will return in 72 hours for a left lower extremity venous ultrasound.    Hayley Closure    Date/Time: 3/4/2022 12:56 PM  Performed by: Bob Loyola MD  Authorized by: Bob Loyola MD     Time out: Immediately prior to the procedure a time out was called    Preparation: Patient was prepped and draped in usual sterile fashion    1st Assist: Shannan Yeh, CST/CSFA    Circulator: Demetrius Koenig RN    Procedure:  VNUS  Procedure side:  Left  One Vein    Vein Treated:  GSV  Patient tolerance:  Patient tolerated the procedure well with no immediate complications  Phlebectomy    Date/Time: 3/4/2022 12:56 PM  Performed by: Bob Loyola MD  Authorized by: Bob Loyola MD     Procedure:  Phlebectomies  Type:  Medically Necessary  Procedure side:  Left  Stabs:  >20  Patient tolerance:  Patient tolerated the procedure well with no immediate complications  Wrap/Hose:  Wraps        Flowsheet Data 3/4/2022   Procedure Start Time:  9:16 AM   Prep: Chloraprep   Side: Left   Tx Length (cm): LEFT GSV: 63   Junction (cm): LEFT GSV: 2.38   RF Cycles: LEFT GSV:13   RF TX Time (Minutes): LEFT GSV: 4:20   # PHLEB Sites: LEFT: 48   Sedation taken: Yes   Pre Pt. Physical / Cognitive Limitations: WNL   TOTAL Local anesthesia Injected (ml): 4   Max Volume Local Anesthesia (ml): 11   TOTAL Tumescent Injected volume (ml): 572   Max Volume Tumescent (ml): 572   Post Pt. Physical / Cognitive Limitations: WNL   Procedure End Time: 11:03 AM   D/C Instructions given, states readiness to leave and escorted to car: Yes       LINN Loyola MD    Dictated using Dragon voice  recognition software which may result in transcription errors    Pre-procedure Nursing Note    Braden Lira presents to clinic for Vein Procedure  .   /Person Responsible for Patient: Joanie Lira (wife)  Phone Number: [phone]    Prophylactic Medication:N/A   Sedation Medication: Ativan, 3mg  ,   Time Taken: 0747 and Clonidine, 0.1mg,   Time Taken: 0747  Compression Stockings: Patient brought  The procedure is being performed on LLE.  Patient understanding of procedure matches consent? YES    Patient's pre-procedure medications verified by Demetrius Koenig RN  .    Demetrius Koenig RN on 3/4/2022 at 7:48 AM      Again, thank you for allowing me to participate in the care of your patient.        Sincerely,        Bob Loyola MD

## 2022-03-04 NOTE — PROGRESS NOTES
Vein Clinic Procedure Note    Indications:  Left leg varicose veins with pain and swelling; symptoms recalcitrant to conservative measures    Procedure:  1. Radiofrequency ablation left great saphenous vein  2. Multiple stab phlebectomies left extremity-medically necessary (greater than 20 stabs)    Surgeon  BIANCA Loyola MD    Procedure Description  Details of the procedure including risks of bleeding, infection, nerve injury, scarring, hyperpigmentation, deep vein thrombosis, recanalization of the great saphenous vein and recurrent varicose veins all discussed.  The patient voiced understanding and wished to proceed.  Informed consent was obtained.     I had the patient stand and marked varicosities coursing from the mid medial thigh down the anteromedial thigh, across the left patella, medial to the left knee and onto the medial left calf, extending posteriorly and laterally.  These terminated on the dorsal lateral aspect of the left foot, all of which were marked with an indelible marker.  We then proceeded the operating room, had the patient lie supine on the operating table, then prepped and draped the left lower extremity sterilely.    We took a timeout to confirm the appropriate operative site and procedure: Radiofrequency ablation of the left great saphenous vein with medically necessary phlebectomies    VNUS Closure  I imaged the left lower extremity easily identifying the left great saphenous vein.  I infiltrated the skin overlying the vein approximately 8 cm distal to the knee, just distal to the last minute tributary, placed a micropuncture needle into the vein followed by a micropuncture guidewire and a 7 Welsh sheath.    We passed the closure fast device through the sheath, positioning of the tip of the device 2.38 centimeters from the saphenofemoral junction under ultrasound guidance with the operating table in Trendelenburg position.  Tumescent anesthetic was injected along the course of the  ----- Message from Tatiana Saldana MD sent at 9/3/2021  1:07 PM EDT -----  No changes with verapamil  Start on celexa 10 mg nightly   See me in 2 weeks  ----- Message -----  From: Melisa George  Sent: 9/3/2021  12:45 PM EDT  To: Tatiana Saldana MD    Patient states she has been under a lot of stress lately and she was wanting her cardiologist to increase verapamil, but cardilogy is concerned about doing that because then her heart rate may go too low when she is not having heart spasms. They suggested she contact you about starting an anxiety med as she can't get in to see cardiology right away. Her therapist also recommends anxiety med. Pt wanting to know if she needs to come see you? Please advise. vein under ultrasound guidance with care to confirm catheter tip position prior to infiltrating the tissues in this location.  The same tumescent anesthetic was also injected around each of the marked varicosities.    After allowing the block to take effect and after confirming appropriate position, I applied compression to the proximal thigh great saphenous vein with the ultrasound probe and additional width 2 fingerbreadths treating the first three 7 cm increments of the vein with 2 RF sessions each.  The remaining vein was treated with 1 RF session to each 7cm increment with the exception of segments of vein where energy readings were higher requiring additional treatments.  We treated down to approximately 8 cm distal to the knee.  The patient was comfortable throughout.    After completing the pullback, I reimaged the vein and the vein to be non-compressible and closed.  The saphenofemoral junction and common femoral veins were fully compressible and free of thrombus. The sheath and the catheter were removed and hemostasis secured with pressure.    Phlebectomies  We made stab wounds beside each of the marked varicosities with 11 scalpel, retrieved the veins with either vein hooks or olman hooks, clamped them with mosquito clamps and avulsed them.  Hemostasis was secured with pressure.  Into these veins were quite thick-walled and were more difficult to remove.    After completing the phlebectomies, the leg was cleaned with saline solution and petroleum jelly was applied to the skin.  The leg was then dressed with ABD pads, cast padding and an Ace bandage from the toes to the groin.   We observed the patient for 30 minutes to ensure excellent hemostasis then took the patient to their ride in a wheelchair.  Post procedure instructions were given to the patient and his wife in verbal and written form.  They both voiced understanding and their questions were answered.    The patient tolerated the procedure well  without evidence of allergic reaction or other complications and will return in 72 hours for a left lower extremity venous ultrasound.    [facility] Closure    Date/Time: 3/4/2022 12:56 PM  Performed by: Bob Loyola MD  Authorized by: Bob Loyola MD     Time out: Immediately prior to the procedure a time out was called    Preparation: Patient was prepped and draped in usual sterile fashion    1st Assist: Shannan Yeh, CST/CSFA    Circulator: Demetrius Koenig RN    Procedure:  VNUS  Procedure side:  Left  One Vein    Vein Treated:  GSV  Patient tolerance:  Patient tolerated the procedure well with no immediate complications  Phlebectomy    Date/Time: 3/4/2022 12:56 PM  Performed by: Bob Loyola MD  Authorized by: Bob Loyola MD     Procedure:  Phlebectomies  Type:  Medically Necessary  Procedure side:  Left  Stabs:  >20  Patient tolerance:  Patient tolerated the procedure well with no immediate complications  Wrap/Hose:  Wraps        Flowsheet Data 3/4/2022   Procedure Start Time:  9:16 AM   Prep: Chloraprep   Side: Left   Tx Length (cm): LEFT GSV: 63   Junction (cm): LEFT GSV: 2.38   RF Cycles: LEFT GSV:13   RF TX Time (Minutes): LEFT GSV: 4:20   # PHLEB Sites: LEFT: 48   Sedation taken: Yes   Pre Pt. Physical / Cognitive Limitations: WNL   TOTAL Local anesthesia Injected (ml): 4   Max Volume Local Anesthesia (ml): 11   TOTAL Tumescent Injected volume (ml): 572   Max Volume Tumescent (ml): 572   Post Pt. Physical / Cognitive Limitations: WNL   Procedure End Time: 11:03 AM   D/C Instructions given, states readiness to leave and escorted to car: Yes       LINN Loyola MD    Dictated using Dragon voice recognition software which may result in transcription errors    Pre-procedure Nursing Note    Braden CATHY Pankaj presents to clinic for Vein Procedure  .   /Person Responsible for Patient: Joanie Lira (wife)  Phone Number: [phone]    Prophylactic  Medication:N/A   Sedation Medication: Ativan, 3mg  ,   Time Taken: 0747 and Clonidine, 0.1mg,   Time Taken: 0747  Compression Stockings: Patient brought  The procedure is being performed on LLE.  Patient understanding of procedure matches consent? YES    Patient's pre-procedure medications verified by Demetrius Koenig RN  .    Demetrius Koenig RN on 3/4/2022 at 7:48 AM

## 2022-03-07 ENCOUNTER — OFFICE VISIT (OUTPATIENT)
Dept: VASCULAR SURGERY | Facility: CLINIC | Age: 52
End: 2022-03-07
Attending: SURGERY
Payer: COMMERCIAL

## 2022-03-07 ENCOUNTER — ANCILLARY PROCEDURE (OUTPATIENT)
Dept: ULTRASOUND IMAGING | Facility: CLINIC | Age: 52
End: 2022-03-07
Attending: SURGERY
Payer: COMMERCIAL

## 2022-03-07 DIAGNOSIS — I83.812 VARICOSE VEINS OF LEFT LOWER EXTREMITY WITH PAIN: ICD-10-CM

## 2022-03-07 DIAGNOSIS — Z09 POSTOP CHECK: Primary | ICD-10-CM

## 2022-03-07 PROCEDURE — 93971 EXTREMITY STUDY: CPT | Mod: LT | Performed by: SURGERY

## 2022-03-07 PROCEDURE — 99207 PR NO CHARGE NURSE ONLY: CPT

## 2022-03-07 NOTE — PROGRESS NOTES
Vein Clinic Postoperative Nurse Note    Patient is here for their 72 hour postoperative visit.    Procedure: Left leg VNUS closure GSV(med nec), >20 stab phlebs(med nec)   Procedure Date: 3/4/22  Surgeon: Dr. Loyola    Ultrasound Result: The left lower extremity is negative for a DVT. The left GSV is closed 10.9 mm from the SFJ to the proximal calf.     Physical Exam: Incisions are approximated without signs of infection.  Ecchymosis: Moderate, especially noted to the left medial thigh.  Swelling: Minimal  Paresthesia: Patient denies numbness to the left lower extremity    Patient Questions or Concerns: Patient stated that him and his family will be flying to [place] at the end of the month for a trip and wanted to confirm this would be okay as well as being in a swimming pool/the ocean. Advised patient to wear his compression hose on that plane and get up a few times throughout the flight, otherwise should be fine to swim.     Reviewed postoperative instructions with patient and provided them with written material of common things to expect from their procedure.     Patient's Next Vein Clinic Appointment: 5/3/22 6 week post op follow up with Dr. Milla Smyth, RN

## 2022-03-07 NOTE — LETTER
3/7/2022         RE: Braden Lira  [address]  [facility] [address]        Dear Colleague,    Thank you for referring your patient, Braden Lira, to the [facility] [place]. Please see a copy of my visit note below.        Vein Clinic Postoperative Nurse Note    Patient is here for their 72 hour postoperative visit.    Procedure: Left leg VNUS closure GSV(med nec), >20 stab phlebs(med nec)   Procedure Date: 3/4/22  Surgeon: Dr. Loyola    Ultrasound Result: The left lower extremity is negative for a DVT. The left GSV is closed 10.9 mm from the SFJ to the proximal calf.     Physical Exam: Incisions are approximated without signs of infection.  Ecchymosis: Moderate, especially noted to the left medial thigh.  Swelling: Minimal  Paresthesia: Patient denies numbness to the left lower extremity    Patient Questions or Concerns: Patient stated that him and his family will be flying to [place] at the end of the month for a trip and wanted to confirm this would be okay as well as being in a swimming pool/the ocean. Advised patient to wear his compression hose on that plane and get up a few times throughout the flight, otherwise should be fine to swim.     Reviewed postoperative instructions with patient and provided them with written material of common things to expect from their procedure.     Patient's Next Vein Clinic Appointment: 5/3/22 6 week post op follow up with Dr. Milla Smyth, [facility]      Again, thank you for allowing me to participate in the care of your patient.        Sincerely,         Vein Nurse

## 2022-03-12 ENCOUNTER — HOSPITAL ENCOUNTER (EMERGENCY)
Facility: CLINIC | Age: 52
Discharge: HOME | End: 2022-03-12
Attending: EMERGENCY MEDICINE | Admitting: EMERGENCY MEDICINE
Payer: COMMERCIAL

## 2022-03-12 ENCOUNTER — APPOINTMENT (OUTPATIENT)
Dept: CT IMAGING | Facility: CLINIC | Age: 52
End: 2022-03-12
Attending: EMERGENCY MEDICINE
Payer: COMMERCIAL

## 2022-03-12 VITALS
BODY MASS INDEX: 31.24 KG/M2 | HEART RATE: 84 BPM | DIASTOLIC BLOOD PRESSURE: 77 MMHG | RESPIRATION RATE: 18 BRPM | OXYGEN SATURATION: 97 % | TEMPERATURE: 97.7 F | HEIGHT: 78 IN | SYSTOLIC BLOOD PRESSURE: 133 MMHG | WEIGHT: 270 LBS

## 2022-03-12 DIAGNOSIS — R10.9 RIGHT FLANK PAIN: ICD-10-CM

## 2022-03-12 LAB
ALBUMIN SERPL-MCNC: 3.8 G/DL (ref 3.4–5)
ALBUMIN UR-MCNC: NEGATIVE MG/DL
ALP SERPL-CCNC: 82 U/L (ref 40–150)
ALT SERPL W P-5'-P-CCNC: 41 U/L (ref 0–70)
ANION GAP SERPL CALCULATED.3IONS-SCNC: 6 MMOL/L (ref 3–14)
APPEARANCE UR: CLEAR
AST SERPL W P-5'-P-CCNC: 27 U/L (ref 0–45)
BASOPHILS # BLD AUTO: 0.1 10E3/UL (ref 0–0.2)
BASOPHILS NFR BLD AUTO: 1 %
BILIRUB SERPL-MCNC: 0.9 MG/DL (ref 0.2–1.3)
BILIRUB UR QL STRIP: NEGATIVE
BUN SERPL-MCNC: 16 MG/DL (ref 7–30)
CALCIUM SERPL-MCNC: 8.3 MG/DL (ref 8.5–10.1)
CHLORIDE BLD-SCNC: 107 MMOL/L (ref 94–109)
CO2 SERPL-SCNC: 26 MMOL/L (ref 20–32)
COLOR UR AUTO: NORMAL
CREAT SERPL-MCNC: 1.14 MG/DL (ref 0.66–1.25)
D DIMER PPP FEU-MCNC: 0.82 UG/ML FEU (ref 0–0.5)
EOSINOPHIL # BLD AUTO: 0.2 10E3/UL (ref 0–0.7)
EOSINOPHIL NFR BLD AUTO: 2 %
ERYTHROCYTE [DISTWIDTH] IN BLOOD BY AUTOMATED COUNT: 12.2 % (ref 10–15)
GFR SERPL CREATININE-BSD FRML MDRD: 78 ML/MIN/1.73M2
GLUCOSE BLD-MCNC: 107 MG/DL (ref 70–99)
GLUCOSE UR STRIP-MCNC: NEGATIVE MG/DL
HCT VFR BLD AUTO: 42.9 % (ref 40–53)
HGB BLD-MCNC: 15 G/DL (ref 13.3–17.7)
HGB UR QL STRIP: NEGATIVE
IMM GRANULOCYTES # BLD: 0 10E3/UL
IMM GRANULOCYTES NFR BLD: 0 %
KETONES UR STRIP-MCNC: NEGATIVE MG/DL
LEUKOCYTE ESTERASE UR QL STRIP: NEGATIVE
LIPASE SERPL-CCNC: 208 U/L (ref 73–393)
LYMPHOCYTES # BLD AUTO: 1.4 10E3/UL (ref 0.8–5.3)
LYMPHOCYTES NFR BLD AUTO: 19 %
MCH RBC QN AUTO: 32.5 PG (ref 26.5–33)
MCHC RBC AUTO-ENTMCNC: 35 G/DL (ref 31.5–36.5)
MCV RBC AUTO: 93 FL (ref 78–100)
MONOCYTES # BLD AUTO: 0.8 10E3/UL (ref 0–1.3)
MONOCYTES NFR BLD AUTO: 10 %
NEUTROPHILS # BLD AUTO: 5.3 10E3/UL (ref 1.6–8.3)
NEUTROPHILS NFR BLD AUTO: 68 %
NITRATE UR QL: NEGATIVE
NRBC # BLD AUTO: 0 10E3/UL
NRBC BLD AUTO-RTO: 0 /100
PH UR STRIP: 6.5 [PH] (ref 5–7)
PLATELET # BLD AUTO: 226 10E3/UL (ref 150–450)
POTASSIUM BLD-SCNC: 4.2 MMOL/L (ref 3.4–5.3)
PROT SERPL-MCNC: 7.3 G/DL (ref 6.8–8.8)
RBC # BLD AUTO: 4.62 10E6/UL (ref 4.4–5.9)
RBC URINE: 1 /HPF
SODIUM SERPL-SCNC: 139 MMOL/L (ref 133–144)
SP GR UR STRIP: 1.01 (ref 1–1.03)
UROBILINOGEN UR STRIP-MCNC: NORMAL MG/DL
WBC # BLD AUTO: 7.7 10E3/UL (ref 4–11)
WBC URINE: 0 /HPF

## 2022-03-12 PROCEDURE — 83690 ASSAY OF LIPASE: CPT | Performed by: EMERGENCY MEDICINE

## 2022-03-12 PROCEDURE — 71275 CT ANGIOGRAPHY CHEST: CPT

## 2022-03-12 PROCEDURE — 250N000011 HC RX IP 250 OP 636: Performed by: EMERGENCY MEDICINE

## 2022-03-12 PROCEDURE — 80053 COMPREHEN METABOLIC PANEL: CPT | Performed by: EMERGENCY MEDICINE

## 2022-03-12 PROCEDURE — 81001 URINALYSIS AUTO W/SCOPE: CPT | Performed by: EMERGENCY MEDICINE

## 2022-03-12 PROCEDURE — 85025 COMPLETE CBC W/AUTO DIFF WBC: CPT | Performed by: EMERGENCY MEDICINE

## 2022-03-12 PROCEDURE — 99285 EMERGENCY DEPT VISIT HI MDM: CPT | Mod: 25

## 2022-03-12 PROCEDURE — 96361 HYDRATE IV INFUSION ADD-ON: CPT

## 2022-03-12 PROCEDURE — 82040 ASSAY OF SERUM ALBUMIN: CPT | Performed by: EMERGENCY MEDICINE

## 2022-03-12 PROCEDURE — 74176 CT ABD & PELVIS W/O CONTRAST: CPT

## 2022-03-12 PROCEDURE — 258N000003 HC RX IP 258 OP 636: Performed by: EMERGENCY MEDICINE

## 2022-03-12 PROCEDURE — 36415 COLL VENOUS BLD VENIPUNCTURE: CPT | Performed by: EMERGENCY MEDICINE

## 2022-03-12 PROCEDURE — 250N000009 HC RX 250: Performed by: EMERGENCY MEDICINE

## 2022-03-12 PROCEDURE — 85379 FIBRIN DEGRADATION QUANT: CPT | Performed by: EMERGENCY MEDICINE

## 2022-03-12 PROCEDURE — 96360 HYDRATION IV INFUSION INIT: CPT | Mod: 59

## 2022-03-12 RX ORDER — SODIUM CHLORIDE 9 MG/ML
INJECTION, SOLUTION INTRAVENOUS CONTINUOUS
Status: DISCONTINUED | OUTPATIENT
Start: 2022-03-12 | End: 2022-03-13 | Stop reason: HOSPADM

## 2022-03-12 RX ORDER — IOPAMIDOL 755 MG/ML
82 INJECTION, SOLUTION INTRAVASCULAR ONCE
Status: COMPLETED | OUTPATIENT
Start: 2022-03-12 | End: 2022-03-12

## 2022-03-12 RX ADMIN — SODIUM CHLORIDE 1000 ML: 9 INJECTION, SOLUTION INTRAVENOUS at 21:50

## 2022-03-12 RX ADMIN — SODIUM CHLORIDE 100 ML: 900 INJECTION INTRAVENOUS at 22:43

## 2022-03-12 RX ADMIN — IOPAMIDOL 82 ML: 755 INJECTION, SOLUTION INTRAVENOUS at 22:43

## 2022-03-12 ASSESSMENT — ENCOUNTER SYMPTOMS
FLANK PAIN: 1
DYSURIA: 0
HEMATURIA: 0
DIARRHEA: 0
SHORTNESS OF BREATH: 0
VOMITING: 0
FREQUENCY: 0
CONSTIPATION: 0
BLOOD IN STOOL: 0

## 2022-03-13 NOTE — ED PROVIDER NOTES
History   Chief Complaint:  Flank Pain       The history is provided by the patient and the spouse.      Braden Lira is a 51 year old male with history of atrial fibrillation and inguinal hernia who presents with flank pain. The patient reports that he began experiencing right flank pain earlier today, about 2.5 hours ago. His pain is reportedly worsened with deep breaths. He also notes that he began experiencing right-sided back pain upon waking up this morning, which resolved. The patient states that he took Tylenol at home today, which improved his symptoms.  He currently reports no pain.  He reports that stretching also helped. His wife is reportedly concerned for a blood clot, for which he presents to the ED. Of note, the patient states that he underwent a left leg varicose vein procedure 8 days ago. He reportedly had a negative ultrasound about 5 days ago, on 03/07/22. He notes chronic bilateral leg swelling due to his varicose veins. The patient reports no history of pneumothorax. At this time, he denies chest pain, shortness of breath, or rash. He notes no dysuria, urinary frequency, or hematuria. He also reports no vomiting, diarrhea, constipation, or bloody stools.    Imaging from [facility] on 03/07/22  US Lower Extremity Venous Duplex Left   1.         Left CFV is patent.   2.         The left GSV is closed 10.9mm from the SFJ to the proximal calf.    Review of Systems   Respiratory: Negative for shortness of breath.    Cardiovascular: Negative for chest pain.   Gastrointestinal: Negative for blood in stool, constipation, diarrhea and vomiting.   Genitourinary: Positive for flank pain (R). Negative for dysuria, frequency and hematuria.   Skin: Negative for rash.   All other systems reviewed and are negative.    Allergies:  No Known Drug Allergies     Medications:  The patient is currently on no regular medications.     Past Medical History:     Atrial fibrillation  Inguinal  "hernia  Varicose veins    Past Surgical History:     Right wrist ganglion cyst removal  Varicose veins surgery     Family History:    Mother: Breast cancer, kidney stones  Father: Cataracts, CAD    Social History:  Presents to the emergency department with his wife   Arrives via car     Physical Exam     Patient Vitals for the past 24 hrs:   BP Temp Temp src Pulse Resp SpO2 Height Weight   03/12/22 2300 -- -- -- -- -- 96 % -- --   03/12/22 2253 133/82 -- -- 84 -- 97 % -- --   03/12/22 2120 (!) 156/80 97.7  F (36.5  C) Temporal 85 18 99 % 1.981 m (6' 6\") 122.5 kg (270 lb)       Physical Exam  General:  Sitting on bed with wife at bedside.   HENT:  No obvious trauma to head  Right Ear:  External ear normal.   Left Ear:  External ear normal.   Nose:  Nose normal.   Eyes:  Conjunctivae and EOM are normal. Pupils are equal, round, and reactive.   Neck: Normal range of motion. Neck supple. No tracheal deviation present.   CV:  Normal heart sounds. No murmur heard.  Pulm/Chest: Effort normal and breath sounds normal.   Abd: Soft. No distension. There is Mild right CVA tenderness. There is no rigidity, no rebound and no guarding.   M/S: Normal range of motion.   Neuro: Alert. GCS 15.  Skin: Skin is warm and dry. No rash noted. Not diaphoretic. Trace bilateral lower extremity swelling  Psych: Normal mood and affect. Behavior is normal.       Emergency Department Course     Imaging:  Abd/pelvis CT no contrast - Stone Protocol   Preliminary Result   IMPRESSION:    1.  There is no pulmonary embolus, aortic aneurysm or dissection.   2.  Mild bibasilar atelectasis, right greater than left.   3.  No urinary stone or hydronephrosis. No acute abdominal or pelvic abnormality.         CT Chest Pulmonary Embolism w Contrast   Preliminary Result   IMPRESSION:    1.  There is no pulmonary embolus, aortic aneurysm or dissection.   2.  Mild bibasilar atelectasis, right greater than left.   3.  No urinary stone or hydronephrosis. No acute " abdominal or pelvic abnormality.           Reading per radiology.     Laboratory:  Labs Ordered and Resulted from Time of ED Arrival to Time of ED Departure   COMPREHENSIVE METABOLIC PANEL - Abnormal       Result Value    Sodium 139      Potassium 4.2      Chloride 107      Carbon Dioxide (CO2) 26      Anion Gap 6      Urea Nitrogen 16      Creatinine 1.14      Calcium 8.3 (*)     Glucose 107 (*)     Alkaline Phosphatase 82      AST 27      ALT 41      Protein Total 7.3      Albumin 3.8      Bilirubin Total 0.9      GFR Estimate 78     D DIMER QUANTITATIVE - Abnormal    D-Dimer Quantitative 0.82 (*)    ROUTINE UA WITH MICROSCOPIC REFLEX TO CULTURE - Normal    Color Urine Light Yellow      Appearance Urine Clear      Glucose Urine Negative      Bilirubin Urine Negative      Ketones Urine Negative      Specific Gravity Urine 1.013      Blood Urine Negative      pH Urine 6.5      Protein Albumin Urine Negative      Urobilinogen Urine Normal      Nitrite Urine Negative      Leukocyte Esterase Urine Negative      RBC Urine 1      WBC Urine 0     LIPASE - Normal    Lipase 208     CBC WITH PLATELETS AND DIFFERENTIAL    WBC Count 7.7      RBC Count 4.62      Hemoglobin 15.0      Hematocrit 42.9      MCV 93      MCH 32.5      MCHC 35.0      RDW 12.2      Platelet Count 226      % Neutrophils 68      % Lymphocytes 19      % Monocytes 10      % Eosinophils 2      % Basophils 1      % Immature Granulocytes 0      NRBCs per 100 WBC 0      Absolute Neutrophils 5.3      Absolute Lymphocytes 1.4      Absolute Monocytes 0.8      Absolute Eosinophils 0.2      Absolute Basophils 0.1      Absolute Immature Granulocytes 0.0      Absolute NRBCs 0.0          Emergency Department Course:     Reviewed:  I reviewed nursing notes, vitals and past medical history    Assessments:  2147 I obtained history and examined the patient as noted above.   2214 I rechecked the patient  2233 I again rechecked and updated the patient.  2324 I rechecked the  patient and explained findings.    Interventions:  2150 NS bolus 1L IV    Disposition:  The patient was discharged to home.     Impression & Plan   Medical Decision Making:  Braden Lira is a very pleasant 51 year old patient who presents to the emergency department with concern of right flank pain.  The patient had left leg vein surgery 8 days ago.  With the flank pain and increased pain with deep inspiration, he and his wife are concerned about pulmonary embolism.  Additionally, there is a family history of kidney stones.  The urine analysis shows no significant hematuria.  Labs are unremarkable other than the D-dimer is slightly elevated.  Because of this, I reviewed the risk and benefit of performing a stone protocol abdomen and pelvis CT followed by CT chest PE protocol.  He consented for this.  CT chest showed no evidence of pneumonia, pneumothorax, aortic dissection or pulmonary embolism.  The CT scan of the abdomen and pelvis without contrast showed no evidence of ureter stone nor any evidence of colitis, diverticulitis, appendicitis or other acute pathology.  The patient's labs are unremarkable.  He is afebrile.  He continues to be pain-free.  He had an ultrasound to the left lower extremity last week and reports continued improvement in it and no concern for DVT.  I appreciate no rash to the flank.  I discussed if he develops any rash that he should be seen again.  He has had no chest pain to suggest that this would be cardiac.  The patient and wife were encouraged by this negative work-up.  We discussed reasons to return.    The treatment plan was discussed with the patient and they expressed understanding of this plan and consented to the plan.  In addition, the patient will return to the emergency department if their symptoms persist, worsen, if new symptoms arise or if there is any concern as other pathology may be present that is not evident at this time. They also understand the importance of close  follow up in the clinic and if unable to do so will return to the emergency department for a reevaluation. All questions were answered.    Diagnosis:    ICD-10-CM    1. Right flank pain  R10.9        Discharge Medications:  New Prescriptions    No medications on file       Scribe Disclosure:  I, Tristin Barreto, am serving as a scribe at 9:47 PM on 3/12/2022 to document services personally performed by Arsh Granados DO based on my observations and the provider's statements to me.         Arsh Granados DO  03/12/22 [id number]

## 2022-03-13 NOTE — ED TRIAGE NOTES
Had verucoise vein surgery to left leg last Friday. He reports upper right flank pain that began this afternoon. Denies hx of blood clots. Denies SOB or chest pain. Denies urinary s/sx, no hx of kidney stones. Reports feeling chilled earlier in the day.

## 2022-03-15 ENCOUNTER — PATIENT OUTREACH (OUTPATIENT)
Dept: FAMILY MEDICINE | Facility: CLINIC | Age: 52
End: 2022-03-15
Payer: COMMERCIAL

## 2022-03-15 NOTE — TELEPHONE ENCOUNTER
"Patient also asked for # to schedule colonoscopy, he was driving during the phone call so I am sending [facility] message with colonoscopy scheduling info.       ED/Discharge Protocol    \"Hi, my name is Sana Boss RN, a registered nurse, and I am calling on behalf of Dr. Yeung's office at [facility].  I am calling to follow up and see how things are going for you after your recent visit.\"    \"I see that you were in the (ER/UC/IP) on 3/12/22.    How are you doing now that you are home?\" Pt says he is doing totally fine and pain free    Is patient experiencing symptoms that may require a hospital visit?  No    Discharge Instructions    \"Let's review your discharge instructions.  What is/are the follow-up recommendations?  Pt. Response: follow-up with Dr. Yee    \"Were you instructed to make a follow-up appointment?\"  Pt. Response: Yes.  Has appointment been made?   No.  \"Can I help you schedule that appointment?\" No, I am feeling 100% better and normal again, I will schedule a physical here this summer.       \"When you see the provider, I would recommend that you bring your discharge instructions with you.    Medications    \"How many new medications are you on since your hospitalization/ED visit?\"    0-1  \"How many of your current medicines changed (dose, timing, name, etc.) while you were in the hospital/ED visit?\"   0-1  \"Do you have questions about your medications?\"   No  \"Were you newly diagnosed with heart failure, COPD, diabetes or did you have a heart attack?\"   No  For patients on insulin: \"Did you start on insulin in the hospital or did you have your insulin dose changed?\"   No  Post Discharge Medication Reconciliation Status: discharge medications reconciled, continue medications without change.    Was MTM referral placed (*Make sure to put transitions as reason for referral)?   No    Call Summary    \"Do you have any questions or concerns about your condition or care plan at the moment?\"    No  Triage " "nurse advice given: make appt for annual physical as soon as possible as Dr. Yee is booking out quite a ways, call us with any questions or concerns     Patient was in ER once in the past year (assess appropriateness of ER visits.)      \"If you have questions or things don't continue to improve, we encourage you contact us through the main clinic number,  [phone].  Even if the clinic is not open, triage nurses are available 24/7 to help you.     We would like you to know that our clinic has extended hours (provide information).  We also have urgent care (provide details on closest location and hours/contact info)\"      \"Thank you for your time and take care!\"    DONNELL Hadley RN  [facility]      "

## 2022-05-03 ENCOUNTER — OFFICE VISIT (OUTPATIENT)
Dept: VASCULAR SURGERY | Facility: CLINIC | Age: 52
End: 2022-05-03
Payer: COMMERCIAL

## 2022-05-03 DIAGNOSIS — Z09 POSTOP CHECK: Primary | ICD-10-CM

## 2022-05-03 DIAGNOSIS — I83.812 VARICOSE VEINS OF LEFT LOWER EXTREMITY WITH PAIN: ICD-10-CM

## 2022-05-03 PROCEDURE — 99207 PR VEINSOLUTIONS POST OPERATIVE VISIT: CPT | Performed by: SURGERY

## 2022-05-03 NOTE — PROGRESS NOTES
[facility] [place] 6-week postop note  Braden Lira returns in follow-up of radiofrequency ablation of his left great saphenous vein with phlebectomies.  Overall he is doing well, says his leg pain is much improved as is the swelling and that he is very happy with the appearance of the leg as well.    Exam  Left lower extremity: Small varicose vein on the distal anterolateral left thigh extending onto the patella.  Small varicose vein over the distal anterolateral leg down to the ankle.  This may represent a thrombosed vein but seems to be ballotable just cephalad of the ankle.    The swelling of his left ankle is much improved compared to his preoperative photos.    The phlebectomy sites are healing satisfactorily.  Mild ecchymosis of the mid anteromedial thigh along the course of the phlebectomies.    Assessment  Good result following the above procedure.  He is very happy with the improvement in his pain and the appearance of his leg.  He continues to wear knee-high compression hose on a daily basis while working and will continue to do so.    Plan  Return for a 6-month post procedure left lower extremity ultrasound.  He will call us sooner if he has concerns or questions.    We will image the distal anterolateral left leg and if there is a significant varicosity in this location, this can be treated with ultrasound-guided sclerotherapy.  This will be discussed at his 6-month post procedure ultrasound.    LINN Loyola MD    Dictated using Dragon voice recognition software which may result in transcription errors

## 2022-05-03 NOTE — LETTER
5/3/2022         RE: Braden Lira  [address]  [facility] [address]        Dear Colleague,    Thank you for referring your patient, Braden Lira, to the [facility] [place]. Please see a copy of my visit note below.    [facility] 6-week postop note  Braden Lira returns in follow-up of radiofrequency ablation of his left great saphenous vein with phlebectomies.  Overall he is doing well, says his leg pain is much improved as is the swelling and that he is very happy with the appearance of the leg as well.    Exam  Left lower extremity: Small varicose vein on the distal anterolateral left thigh extending onto the patella.  Small varicose vein over the distal anterolateral leg down to the ankle.  This may represent a thrombosed vein but seems to be ballotable just cephalad of the ankle.    The swelling of his left ankle is much improved compared to his preoperative photos.    The phlebectomy sites are healing satisfactorily.  Mild ecchymosis of the mid anteromedial thigh along the course of the phlebectomies.    Assessment  Good result following the above procedure.  He is very happy with the improvement in his pain and the appearance of his leg.  He continues to wear knee-high compression hose on a daily basis while working and will continue to do so.    Plan  Return for a 6-month post procedure left lower extremity ultrasound.  He will call us sooner if he has concerns or questions.    We will image the distal anterolateral left leg and if there is a significant varicosity in this location, this can be treated with ultrasound-guided sclerotherapy.  This will be discussed at his 6-month post procedure ultrasound.    LINN Loyola MD    Dictated using Dragon voice recognition software which may result in transcription errors      Again, thank you for allowing me to participate in the care of your patient.        Sincerely,        Bob Loyola MD

## 2022-08-20 ENCOUNTER — HEALTH MAINTENANCE LETTER (OUTPATIENT)
Age: 52
End: 2022-08-20

## 2022-11-15 ENCOUNTER — ANCILLARY PROCEDURE (OUTPATIENT)
Dept: ULTRASOUND IMAGING | Facility: CLINIC | Age: 52
End: 2022-11-15
Attending: SURGERY
Payer: COMMERCIAL

## 2022-11-15 ENCOUNTER — OFFICE VISIT (OUTPATIENT)
Dept: VASCULAR SURGERY | Facility: CLINIC | Age: 52
End: 2022-11-15
Attending: SURGERY
Payer: COMMERCIAL

## 2022-11-15 DIAGNOSIS — I83.812 VARICOSE VEINS OF LEFT LOWER EXTREMITY WITH PAIN: Primary | ICD-10-CM

## 2022-11-15 DIAGNOSIS — I83.812 VARICOSE VEINS OF LEFT LOWER EXTREMITY WITH PAIN: ICD-10-CM

## 2022-11-15 DIAGNOSIS — Z09 POSTOP CHECK: ICD-10-CM

## 2022-11-15 PROCEDURE — 93971 EXTREMITY STUDY: CPT | Mod: LT | Performed by: SURGERY

## 2022-11-15 PROCEDURE — 99213 OFFICE O/P EST LOW 20 MIN: CPT | Performed by: SURGERY

## 2022-11-15 NOTE — LETTER
11/15/2022         RE: Brdaen Lira  [address]  [facility] [address]        Dear Colleague,    Thank you for referring your patient, Braden Lira, to the [facility] [place]. Please see a copy of my visit note below.    [facility] 6-month post VNUS Closure follow-up  Braden Lira returns in follow-up of radiofrequency ablation of his left great saphenous vein with medically necessary phlebectomies on 3/4/2022.  Overall he is very happy with results stating that his leg pain has resolved and that there are no residual varicose veins.  He continues to wear knee-high compression on a daily basis, however.    Exam  Left lower extremity: No significant, residual varicose veins.  No edema.    Ultrasound  Name:  Braden Lira                                                  Patient ID: [id number]  Date: November 15, 2022                                          : 1970  Sex: male                                                                    Examined by: MARCE Yousif RVT  Age:  52 year old                                                         Reading MD: CORNEL     INDICATIONS:    Post VNUS Closure     EXAM TYPE  LEFT LOWER EXTREMITY VENOUS DUPLEX   6 MONTH POST VNUS CLOSURE  GSV     TECHNICAL SUMMARY     Multiple transverse and longitudinal images of left lower extremity were obtained.     LEFT:       The CFV demonstrates phasic flow, compresses, and responds to augmentations.  No evidence of DVT at this time.  The remainder of deep veins including left femoral, popliteal, posterior tibia,l and peroneal veins are widely patent and fully compressible with no evidence for DVT at this time.     The GSV is closed 12.9 mm from the SFJ to the proximal calf with evidence of thrombus seen throughout.     FINAL SUMMARY:  1.         Left CFV is patent.  2.         The left GSV is closed 12.9 mm from the SFJ to the proximal calf.    Assessment  Good result following radiofrequency  ablation left great saphenous vein with phlebectomies March 2022.  The patient is very happy.    I discussed the benefits of continue to wear knee-high compression on a daily basis.  He has found some compression that fits in well and that he is wearing on a daily basis.    Plan  Return on an as-needed basis    LINN Loyola MD, FACS    Dictated using Dragon voice recognition software which may result in transcription errors      Again, thank you for allowing me to participate in the care of your patient.        Sincerely,        Bob Lyoola MD

## 2022-11-15 NOTE — PROGRESS NOTES
[facility] [place] 6-month post VNUS Closure follow-up  Braden Lira returns in follow-up of radiofrequency ablation of his left great saphenous vein with medically necessary phlebectomies on 3/4/2022.  Overall he is very happy with results stating that his leg pain has resolved and that there are no residual varicose veins.  He continues to wear knee-high compression on a daily basis, however.    Exam  Left lower extremity: No significant, residual varicose veins.  No edema.    Ultrasound  Name:  Braden Lira                                                  Patient ID: [id number]  Date: November 15, 2022                                          : 1970  Sex: male                                                                    Examined by: MARCE Yousif RVT  Age:  52 year old                                                         Reading MD: CORNEL     INDICATIONS:    Post VNUS Closure     EXAM TYPE  LEFT LOWER EXTREMITY VENOUS DUPLEX   6 MONTH POST VNUS CLOSURE  GSV     TECHNICAL SUMMARY     Multiple transverse and longitudinal images of left lower extremity were obtained.     LEFT:       The CFV demonstrates phasic flow, compresses, and responds to augmentations.  No evidence of DVT at this time.  The remainder of deep veins including left femoral, popliteal, posterior tibia,l and peroneal veins are widely patent and fully compressible with no evidence for DVT at this time.     The GSV is closed 12.9 mm from the SFJ to the proximal calf with evidence of thrombus seen throughout.     FINAL SUMMARY:  1.         Left CFV is patent.  2.         The left GSV is closed 12.9 mm from the SFJ to the proximal calf.    Assessment  Good result following radiofrequency ablation left great saphenous vein with phlebectomies 2022.  The patient is very happy.    I discussed the benefits of continue to wear knee-high compression on a daily basis.  He has found some compression that fits in well and that he is  wearing on a daily basis.    Plan  Return on an as-needed basis    LINN Loyola MD, FACS    Dictated using Dragon voice recognition software which may result in transcription errors

## 2022-11-20 ENCOUNTER — HEALTH MAINTENANCE LETTER (OUTPATIENT)
Age: 52
End: 2022-11-20

## 2023-02-24 ENCOUNTER — OFFICE VISIT (OUTPATIENT)
Dept: URGENT CARE | Facility: URGENT CARE | Age: 53
End: 2023-02-24
Payer: COMMERCIAL

## 2023-02-24 VITALS
OXYGEN SATURATION: 98 % | WEIGHT: 270 LBS | RESPIRATION RATE: 18 BRPM | TEMPERATURE: 97.8 F | BODY MASS INDEX: 31.2 KG/M2 | HEART RATE: 71 BPM | SYSTOLIC BLOOD PRESSURE: 142 MMHG | DIASTOLIC BLOOD PRESSURE: 84 MMHG

## 2023-02-24 DIAGNOSIS — S61.012A THUMB LACERATION, LEFT, INITIAL ENCOUNTER: Primary | ICD-10-CM

## 2023-02-24 PROCEDURE — 90471 IMMUNIZATION ADMIN: CPT | Performed by: FAMILY MEDICINE

## 2023-02-24 PROCEDURE — 90715 TDAP VACCINE 7 YRS/> IM: CPT | Performed by: FAMILY MEDICINE

## 2023-02-24 PROCEDURE — 12001 RPR S/N/AX/GEN/TRNK 2.5CM/<: CPT | Performed by: FAMILY MEDICINE

## 2023-02-24 NOTE — PROGRESS NOTES
SUBJECTIVE: @[email or web address]@.ident who presents to the clinic with a laceration on the thumb sustained  hour(s) ago.    This is a non-work related and accidental injury.    Mechanism of injury: knife.  Associated symptoms: Denies numbness, weakness, or loss of function  Last tetanus booster within 10 years: no    Past Medical History:   Diagnosis Date     Atrial fibrillation (H)     cardioversion 12/7/2016     CARDIOVASCULAR SCREENING; LDL GOAL LESS THAN 160 05/09/2010     No Known Allergies  Social History     Tobacco Use     Smoking status: Never     Smokeless tobacco: Never   Substance Use Topics     Alcohol use: Yes     Comment: 6 drinks a week       ROS:  Neuro: good distal sensation  Motor: normal rom and strenght  Hem: capillary refill < 2 sec    EXAM: The patient appears today in alert,no apparent distress distressVITALS:   BP (!) 142/84   Pulse 71   Temp 97.8  F (36.6  C)   Resp 18   Wt 122.5 kg (270 lb)   SpO2 98%   BMI 31.20 kg/m    Size of laceration: 1 centimeters  Characteristics of the laceration: clean, flap type and straight  Tendon function intact: yes  Sensation to light touch intact: yes  Pulses/Capillary refill intact: yes      ICD-10-CM    1. Thumb laceration, left, initial encounter  S61.012A REPAIR SUPERFICIAL, WOUND BODY < =2.5CM     TDAP VACCINE (Adacel, Boostrix)  [[id number]]          Procedure Note:Wound injected with 1 cc's of Lidocaine 1% plain  Good anesthesia was obtainedPrepped and draped in the usual sterile fashion  Wound cleaned with sterile water  Wound soakedLaceration was closed using 2 5-0 nylon interrupted sutures  After care instructions:Keep wound clean   Sutures out in 10 days  Signs of infection discussed today

## 2023-05-10 ENCOUNTER — MYC MEDICAL ADVICE (OUTPATIENT)
Dept: FAMILY MEDICINE | Facility: CLINIC | Age: 53
End: 2023-05-10
Payer: COMMERCIAL

## 2023-05-10 DIAGNOSIS — Z12.11 ENCOUNTER FOR SCREENING FOR MALIGNANT NEOPLASM OF COLON: Primary | ICD-10-CM

## 2023-05-11 NOTE — TELEPHONE ENCOUNTER
Dr. Yee-Patient requesting screening colonoscopy referral-pended.  Patient's last visit was 6/4/21 and patient will be reminded to schedule annual physical.    Thank you!  IVAN LingN, RN-[facility]

## 2023-05-22 ENCOUNTER — TELEPHONE (OUTPATIENT)
Dept: GASTROENTEROLOGY | Facility: CLINIC | Age: 53
End: 2023-05-22
Payer: COMMERCIAL

## 2023-05-22 NOTE — TELEPHONE ENCOUNTER
.  Screening Questions  BLUE  KIND OF PREP RED  LOCATION [review exclusion criteria] GREEN  SEDATION TYPE        Y Are you active on mychart?       MATHEW Ordering/Referring Provider?        BCBS What type of coverage do you have?      N Have you had a positive covid test in the last 14 days?     31.2 1. BMI  [BMI 40+ - review exclusion criteria& smart-phrase document]    Y  2. Are you able to give consent for your medical care? [IF NO,RN REVIEW]          N  3. Are you taking any prescription pain medications on a routine schedule   (ex narcotics: oxycodone, roxicodone, oxycontin,  and percocet)? [RN Review]          3a. EXTENDED PREP What kind of prescription?     N 4. Do you have any chemical dependencies such as alcohol, street drugs, or methadone?        **If yes 3- 5 , please schedule with MAC sedation.**          IF YES TO ANY 6 - 10 - HOSPITAL SETTING ONLY.     N 6.   Do you need assistance transferring?     N 7.   Have you had a heart or lung transplant?    N 8.   Are you currently on dialysis?   N 9.   Do you use daily home oxygen?   N 10. Do you take nitroglycerin?   10a.  If yes, how often?      11. Are you currently pregnant?    11a.  If yes, how many weeks? [ Greater than 12 weeks, OR NEEDED]    N 12. Do you have Pulmonary Hypertension? *NEED PAC APPT AT [facility] w/ [facility]*     N 13. [review exclusion criteria]  Do you have any implantable devices in your body (pacemaker, defib, LVAD)?    N 14. In the past 6 months, have you had any heart related issues including cardiomyopathy or heart attack?     14a.  If yes, did it require cardiac stenting if so when?     N 15. Have you had a stroke or Transient ischemic attack (TIA - aka  mini stroke ) within 6 months?      N 16. Do you have mod to severe Obstructive Sleep Apnea?  [Hospital only]    N 17. Do you have SEVERE AND UNCONTROLLED asthma? *NEED PAC APPT AT [facility] w/[facility]*     18.Do you take blood thinners?  No    N 19. Do you take any of the following  "medications?    NPhentermine    NOzempic    NWegovy (Semaglutide)      19a. If yes, \"Hold for 7 days before procedure.  Please consult your prescribing provider if you have questions about holding this medication.\"     N  20. Do you have chronic kidney disease?      N  21. Do you have a diagnosis of diabetes?     N  22. On a regular basis do you go 3-5 days between bowel movements?      23. Preferred LOCAL Pharmacy for Pre Prescription      [id number] [place] [phone] AT [facility] [address]    - CLOSING REMINDERS -    You will receive a call from a Nurse to review instructions and health history.  This assessment must be completed prior to your procedure.  Failure to complete the Nurse assessment may result in the procedure being cancelled.      On the day of your procedure, please designatean adult(s) who can drive you home stay with you for the next 24 hours. The medicines used in the exam will make you sleepy. You will not be able to drive.      You cannot take public transportation, ride share services, or non-medical taxi service without a responsible caregiver.  Medical transport services are allowed with the requirement that a responsible caregiver will receive you at your destination.  We require that drivers and caregivers are confirmed prior to your procedure.      - SCHEDULING DETAILS -  NO &  Hospital Setting Required & If yes, what is the exclusion?   KEENA  Surgeon    6/12  Date of Procedure  Lower Endoscopy [Colonoscopy]  [facility] GATORADE WITHOUT MAGNEISUM CITRATE Which Colonoscopy Prep was Sent?     CS Sedation Type     N PAC / Pre-op Required     "

## 2023-06-12 ENCOUNTER — HOSPITAL ENCOUNTER (OUTPATIENT)
Facility: CLINIC | Age: 53
Discharge: HOME | End: 2023-06-12
Attending: COLON & RECTAL SURGERY | Admitting: COLON & RECTAL SURGERY
Payer: COMMERCIAL

## 2023-06-12 VITALS
OXYGEN SATURATION: 96 % | DIASTOLIC BLOOD PRESSURE: 87 MMHG | BODY MASS INDEX: 30.78 KG/M2 | SYSTOLIC BLOOD PRESSURE: 130 MMHG | WEIGHT: 266 LBS | RESPIRATION RATE: 9 BRPM | HEART RATE: 68 BPM | HEIGHT: 78 IN

## 2023-06-12 LAB — COLONOSCOPY: NORMAL

## 2023-06-12 PROCEDURE — G0500 MOD SEDAT ENDO SERVICE >5YRS: HCPCS | Performed by: COLON & RECTAL SURGERY

## 2023-06-12 PROCEDURE — 45378 DIAGNOSTIC COLONOSCOPY: CPT | Performed by: COLON & RECTAL SURGERY

## 2023-06-12 PROCEDURE — G0121 COLON CA SCRN NOT HI RSK IND: HCPCS | Performed by: COLON & RECTAL SURGERY

## 2023-06-12 PROCEDURE — 250N000011 HC RX IP 250 OP 636: Performed by: COLON & RECTAL SURGERY

## 2023-06-12 RX ORDER — ONDANSETRON 2 MG/ML
4 INJECTION INTRAMUSCULAR; INTRAVENOUS
Status: DISCONTINUED | OUTPATIENT
Start: 2023-06-12 | End: 2023-06-12 | Stop reason: HOSPADM

## 2023-06-12 RX ORDER — LIDOCAINE 40 MG/G
CREAM TOPICAL
Status: DISCONTINUED | OUTPATIENT
Start: 2023-06-12 | End: 2023-06-12 | Stop reason: HOSPADM

## 2023-06-12 RX ORDER — FENTANYL CITRATE 50 UG/ML
INJECTION, SOLUTION INTRAMUSCULAR; INTRAVENOUS PRN
Status: DISCONTINUED | OUTPATIENT
Start: 2023-06-12 | End: 2023-06-12 | Stop reason: HOSPADM

## 2023-06-12 ASSESSMENT — ACTIVITIES OF DAILY LIVING (ADL): ADLS_ACUITY_SCORE: 35

## 2023-06-12 NOTE — H&P
Colon & Rectal Surgery History and Physical  Pre-Endoscopy Procedure Note    History of Present Illness   I have been asked by Dr. Yee to evaluate this 53 year old male for colorectal cancer screening. He currently denies any abdominal pain, weight loss, bleeding per rectum, or recent change in bowel habits.    Past Medical History  Diagnosis Date     Atrial fibrillation     cardioversion 12/7/2016       Past Surgical History  Procedure Laterality Date     EXCISION CYST  9-06    ganglion cyst removed from right wrist        Medications  Medication Sig     metoprolol tartrate (LOPRESSOR) 50 MG tablet TAKE 0.5 TABLETS (25 MG) BY MOUTH AS NEEDED (A FIB)     sildenafil (VIAGRA) 25 MG tablet Take 1 tablet (25 mg) by mouth daily as needed 30 min to 4 hrs before sex. Do not use with nitroglycerin, terazosin or doxazosin.       Allergies   No Known Allergies     Family History   Family history includes Breast Cancer in his mother; C.A.D. in his father, paternal grandfather, and paternal uncle; Cerebrovascular Disease in his paternal aunt; Diabetes in his maternal grandmother; Eye Disorder in his father and maternal grandmother.     Social History   He reports that he has never smoked. He has never used smokeless tobacco. He reports current alcohol use. He reports that he does not use drugs.    Review of Systems   Constitutional:  No fever, weight change or fatigue.    Eyes:     No dry eyes or vision changes.   Ears/Nose/Throat/Neck:  No oral ulcers, sore throat or voice change.    Cardiovascular:   No palpitations, syncope, angina or edema.   Respiratory:    No chest pain, excessive sleepiness, shortness of breath or hemoptysis.    Gastrointestinal:   No abdominal pain, nausea, vomiting, diarrhea or heartburn.    Genitourinary:   No dysuria, hematuria, urinary retention or urinary frequency.   Musculoskeletal:  No joint swelling or arthralgias.    Dermatologic:  No skin rash or other skin changes.   Neurologic:    No  "focal weakness or numbness. No neuropathy.   Psychiatric:    No depression, anxiety, suicidal ideation, or paranoid ideation.   Endocrine:   No cold or heat intolerance, polydipsia, hirsutism, change in libido, or flushing.   Hematology/Lymphatic:  No bleeding or lymphadenopathy.    Allergy/Immunology:  No rhinitis or hives.     Physical Exam   Vitals:  /99, RR 16, HR 64, height 1.981 m (6' 6\"), weight 120.7 kg (266 lb), SpO2 99 %.    General:  Alert and oriented to person, place and time   Airway: Normal oropharyngeal airway and neck mobility   Lungs:  Clear bilaterally   Heart:  Regular rate and rhythm   Abdomen: Soft, NT, ND, no masses   Extremities: Warm, good capillary refill    ASA Grade: II (mild systemic disease)    Impression: Cleared for use of conscious sedation for colorectal cancer screening    Plan: Proceed with colonoscopy     Lynn Chavarria MD  [facility]  [phone]  "

## 2023-09-16 ENCOUNTER — HEALTH MAINTENANCE LETTER (OUTPATIENT)
Age: 53
End: 2023-09-16

## 2024-06-26 ENCOUNTER — TRANSFERRED RECORDS (OUTPATIENT)
Dept: HEALTH INFORMATION MANAGEMENT | Facility: CLINIC | Age: 54
End: 2024-06-26
Payer: COMMERCIAL

## 2024-08-12 ENCOUNTER — TRANSFERRED RECORDS (OUTPATIENT)
Dept: HEALTH INFORMATION MANAGEMENT | Facility: CLINIC | Age: 54
End: 2024-08-12
Payer: COMMERCIAL

## 2024-08-22 ENCOUNTER — TRANSFERRED RECORDS (OUTPATIENT)
Dept: HEALTH INFORMATION MANAGEMENT | Facility: CLINIC | Age: 54
End: 2024-08-22
Payer: COMMERCIAL

## 2024-11-09 ENCOUNTER — HEALTH MAINTENANCE LETTER (OUTPATIENT)
Age: 54
End: 2024-11-09

## (undated) RX ORDER — FENTANYL CITRATE 50 UG/ML
INJECTION, SOLUTION INTRAMUSCULAR; INTRAVENOUS
Status: DISPENSED
Start: 2023-06-12